# Patient Record
Sex: FEMALE | Race: WHITE | Employment: FULL TIME | URBAN - METROPOLITAN AREA
[De-identification: names, ages, dates, MRNs, and addresses within clinical notes are randomized per-mention and may not be internally consistent; named-entity substitution may affect disease eponyms.]

---

## 2017-02-27 ENCOUNTER — OFFICE VISIT (OUTPATIENT)
Dept: FAMILY MEDICINE | Facility: CLINIC | Age: 27
End: 2017-02-27
Payer: COMMERCIAL

## 2017-02-27 VITALS
BODY MASS INDEX: 30.04 KG/M2 | HEART RATE: 91 BPM | TEMPERATURE: 97.6 F | SYSTOLIC BLOOD PRESSURE: 110 MMHG | DIASTOLIC BLOOD PRESSURE: 76 MMHG | WEIGHT: 153 LBS | HEIGHT: 60 IN | OXYGEN SATURATION: 98 %

## 2017-02-27 DIAGNOSIS — Z32.00 ENCOUNTER FOR CONFIRMATION OF PREGNANCY TEST RESULT WITH PHYSICAL EXAMINATION: Primary | ICD-10-CM

## 2017-02-27 LAB — BETA HCG QUAL IFA URINE: POSITIVE

## 2017-02-27 PROCEDURE — 84703 CHORIONIC GONADOTROPIN ASSAY: CPT | Performed by: PHYSICIAN ASSISTANT

## 2017-02-27 PROCEDURE — 99213 OFFICE O/P EST LOW 20 MIN: CPT | Performed by: PHYSICIAN ASSISTANT

## 2017-02-27 RX ORDER — PRENATAL VIT/IRON FUM/FOLIC AC 27MG-0.8MG
1 TABLET ORAL DAILY
Qty: 100 TABLET | Refills: 3 | COMMUNITY
Start: 2017-02-27 | End: 2019-01-28

## 2017-02-27 NOTE — MR AVS SNAPSHOT
After Visit Summary   2/27/2017    Guera Abreu    MRN: [id number]           Patient Information     Date Of Birth          1990        Visit Information        Provider Department      2/27/2017 6:00 PM Lori Fajardo PA-C [facility]'s Diagnoses     Encounter for confirmation of pregnancy test result with physical examination    -  1      Care Instructions    Please have ultrasound done this week.  Followup will be dependent on the results of scan.          Follow-ups after your visit        Future tests that were ordered for you today     Open Future Orders        Priority Expected Expires Ordered    US OB < 14 Weeks Single Routine  2/27/2018 2/27/2017            Who to contact     If you have questions or need follow up information about today's clinic visit or your schedule please contact [facility] directly at [phone].  Normal or non-critical lab and imaging results will be communicated to you by MyChart, letter or phone within 4 business days after the clinic has received the results. If you do not hear from us within 7 days, please contact the clinic through ShareDeskhart or phone. If you have a critical or abnormal lab result, we will notify you by phone as soon as possible.  Submit refill requests through Garages2Envy or call your pharmacy and they will forward the refill request to us. Please allow 3 business days for your refill to be completed.          Additional Information About Your Visit        MyChart Information     Garages2En[employer] gives you secure access to your electronic health record. If you see a primary care provider, you can also send messages to your care team and make appointments. If you have questions, please call your primary care clinic.  If you do not have a primary care provider, please call [phone] and they will assist you.        Care EveryWhere ID     This is your Care EveryWhere ID. This could be used by other  organizations to access your [place] medical records  [id number]        Your Vitals Were     Pulse Temperature Height Last Period Pulse Oximetry Breastfeeding?    91 97.6  F (36.4  C) (Oral) 5' (1.524 m) 12/24/2016 (Exact Date) 98% No    BMI (Body Mass Index)                   29.88 kg/m2            Blood Pressure from Last 3 Encounters:   02/27/17 110/76   06/14/12 110/60   12/20/10 104/64    Weight from Last 3 Encounters:   02/27/17 153 lb (69.4 kg)   06/14/12 118 lb (53.5 kg)   12/20/10 117 lb 2 oz (53.1 kg) (29 %)*     * Growth percentiles are based on CDC 2-20 Years data.              We Performed the Following     Beta HCG qual IFA urine        Primary Care Provider Office Phone # Fax #    Charlotte Green [phone]       [address] [address]        Thank you!     Thank you for choosing [facility]  for your care. Our goal is always to provide you with excellent care. Hearing back from our patients is one way we can continue to improve our services. Please take a few minutes to complete the written survey that you may receive in the mail after your visit with us. Thank you!             Your Updated Medication List - Protect others around you: Learn how to safely use, store and throw away your medicines at www.disposemymeds.org.          This list is accurate as of: 2/27/17  6:41 PM.  Always use your most recent med list.                   Brand Name Dispense Instructions for use    prenatal multivitamin  plus iron 27-0.8 MG Tabs per tablet     100 tablet    Take 1 tablet by mouth daily

## 2017-02-28 ENCOUNTER — TRANSFERRED RECORDS (OUTPATIENT)
Dept: HEALTH INFORMATION MANAGEMENT | Facility: CLINIC | Age: 27
End: 2017-02-28

## 2017-02-28 NOTE — PROGRESS NOTES
SUBJECTIVE:                                                    Guera Abreu is a 26 year old female who presents to clinic today for the following health issues:      Pregnancy Confirmation -     Pt pregnancy History: none                 No LMP recorded. LMP -  12/24/2016 - lasted 3 days - 6 days late 01/27/2017 - 1 day menses                       History:    Positive Pregnancy test at home: YES- 8  Planned or unplanned pregnancy: Unplanned, Desired  Periods regular or irregular: regular  Taking Prenatal vitamins: YES    Accompanying Signs & Symptoms:   Bleeding: light to  Normal - lasts approx 3 days  Cramping: YES- mild 1st day -- little cramping on R side  Nausea and vomiting: None with menses -- curr yes - nausea  Breast Tenderness: None with menses -- Currently      Patient reports that she had a positive pregnancy test at home on Friday night.    Patient is feeling good and does not have concerns regarding symptoms.        Patient reports bleeding on January 27th for one day and a regular period on 12.24.2016.        Problem list and histories reviewed & adjusted, as indicated.  Additional history: as documented      ROS:  Constitutional, HEENT, cardiovascular, pulmonary, GI, , musculoskeletal, neuro, skin, endocrine and psych systems are negative, except as otherwise noted.    OBJECTIVE:                                                    /76 (BP Location: Left arm, Patient Position: Chair, Cuff Size: Adult Regular)  Pulse 91  Temp 97.6  F (36.4  C) (Oral)  Ht 5' (1.524 m)  Wt 153 lb (69.4 kg)  LMP 12/24/2016 (Exact Date)  SpO2 98%  Breastfeeding? No  BMI 29.88 kg/m2  Body mass index is 29.88 kg/(m^2).  GENERAL: healthy, alert and no distress  EYES: Eyes grossly normal to inspection, PERRL and conjunctivae and sclerae normal  NECK: no adenopathy, no asymmetry, masses, or scars and thyroid normal to palpation  RESP: lungs clear to auscultation - no rales, rhonchi or wheezes  CV: regular rate  and rhythm, normal S1 S2, no S3 or S4, no murmur, click or rub, no peripheral edema and peripheral pulses strong  ABDOMEN: soft, nontender, no hepatosplenomegaly, no masses and bowel sounds normal  MS: no gross musculoskeletal defects noted, no edema  SKIN: no suspicious lesions or rashes  NEURO: Normal strength and tone, mentation intact and speech normal  PSYCH: mentation appears normal, affect normal/bright    Diagnostic Test Results:  Urine HCG - Positive  Ultrasound - Pending     ASSESSMENT/PLAN:                                                      Guera was seen today for confirmation of pregnancy.    Diagnoses and all orders for this visit:    Encounter for confirmation of pregnancy test result with physical examination  -     Beta HCG qual IFA urine  -     US OB < 14 Weeks Single; Future    - Patient advised to have ultrasound done this week for dating purposes.  Followup will be based on pregnancy dates.    - Patient advised that it is typical to followup with OB/GYN around 8-10 weeks for blood work and pre-reina care.    - Patient is on pre- vitamin.      - Patient advised to followup as needed and to plan for a visit around 8-10 weeks to see OB care with either Dr. Green or Dr. Berry.      See Patient Instructions        Lori Fajardo PA-C    [facility] [place]

## 2017-02-28 NOTE — NURSING NOTE
Chief Complaint   Patient presents with     Confirmation Of Pregnancy       Initial /76 (BP Location: Left arm, Patient Position: Chair, Cuff Size: Adult Regular)  Pulse 91  Temp 97.6  F (36.4  C) (Oral)  Ht 5' (1.524 m)  Wt 153 lb (69.4 kg)  LMP 12/24/2016 (Exact Date)  SpO2 98%  Breastfeeding? No  BMI 29.88 kg/m2 Estimated body mass index is 29.88 kg/(m^2) as calculated from the following:    Height as of this encounter: 5' (1.524 m).    Weight as of this encounter: 153 lb (69.4 kg).  Medication Reconciliation: complete   Csaba Mlnarik CMA

## 2017-03-01 ENCOUNTER — TRANSFERRED RECORDS (OUTPATIENT)
Dept: HEALTH INFORMATION MANAGEMENT | Facility: CLINIC | Age: 27
End: 2017-03-01

## 2017-03-01 LAB — PAP SMEAR - HIM PATIENT REPORTED: NEGATIVE

## 2017-03-06 ENCOUNTER — TELEPHONE (OUTPATIENT)
Dept: FAMILY MEDICINE | Facility: CLINIC | Age: 27
End: 2017-03-06

## 2017-03-06 DIAGNOSIS — Z32.00 ENCOUNTER FOR CONFIRMATION OF PREGNANCY TEST RESULT WITH PHYSICAL EXAMINATION: Primary | ICD-10-CM

## 2017-03-06 NOTE — TELEPHONE ENCOUNTER
Pt called to check on results.  Please call her at [phone].  OK to leave detailed message.  Maryann Domingo  Patient

## 2017-03-06 NOTE — TELEPHONE ENCOUNTER
Please call patient to inform her:    I Received a faxed report of patient's early OB ultrasound from [facility].  It seems like the dating is too early to identify anything on ultrasound.  It is advised that she have a followup ultrasound done in 2 weeks.      Please ask patient if she plans to go to [facility] again for the scan.  The order for [facility] radiology is still in the chart if she desires to go there.     Thank you.

## 2017-03-06 NOTE — TELEPHONE ENCOUNTER
Pt will go to [facility] does not have later appts.    Pt has an OB they are thinking about going through.  [facility] [phone].    Judy Vasquez RN

## 2017-03-31 LAB
ABO + RH BLD: NORMAL
ABO + RH BLD: NORMAL
BLD GP AB SCN SERPL QL: NORMAL
HBV SURFACE AG SERPL QL IA: NORMAL
HIV 1+2 AB+HIV1 P24 AG SERPL QL IA: NORMAL
PLATELET # BLD AUTO: 250 10^9/L
RUBELLA ANTIBODY IGG QUANTITATIVE: NORMAL IU/ML
T PALLIDUM IGG SER QL: NORMAL

## 2017-06-10 ENCOUNTER — HEALTH MAINTENANCE LETTER (OUTPATIENT)
Age: 27
End: 2017-06-10

## 2017-09-11 ENCOUNTER — OFFICE VISIT (OUTPATIENT)
Dept: FAMILY MEDICINE | Facility: CLINIC | Age: 27
End: 2017-09-11
Payer: COMMERCIAL

## 2017-09-11 VITALS
BODY MASS INDEX: 32.39 KG/M2 | SYSTOLIC BLOOD PRESSURE: 112 MMHG | HEART RATE: 85 BPM | WEIGHT: 165 LBS | HEIGHT: 60 IN | TEMPERATURE: 98.4 F | DIASTOLIC BLOOD PRESSURE: 72 MMHG | OXYGEN SATURATION: 99 %

## 2017-09-11 DIAGNOSIS — K14.1 GEOGRAPHIC TONGUE: Primary | ICD-10-CM

## 2017-09-11 PROCEDURE — 99213 OFFICE O/P EST LOW 20 MIN: CPT | Performed by: PHYSICIAN ASSISTANT

## 2017-09-11 NOTE — Clinical Note
Please abstract the following data from this visit with this patient into the appropriate field in Epic:  Pap smear done on this date: 03/2017 (approximately), by this group: [facility], results were Normal.

## 2017-09-11 NOTE — MR AVS SNAPSHOT
After Visit Summary   9/11/2017    Guera Abreu    MRN: [id number]           Patient Information     Date Of Birth          1990        Visit Information        Provider Department      9/11/2017 6:00 PM Lori Fajardo PA-C [facility]        Today's Diagnoses     Geographic tongue    -  1       Follow-ups after your visit        Who to contact     If you have questions or need follow up information about today's clinic visit or your schedule please contact [facility] directly at [phone].  Normal or non-critical lab and imaging results will be communicated to you by Be[employer] Musichart, letter or phone within 4 business days after the clinic has received the results. If you do not hear from us within 7 days, please contact the clinic through ihijit or phone. If you have a critical or abnormal lab result, we will notify you by phone as soon as possible.  Submit refill requests through Bonegrafix or call your pharmacy and they will forward the refill request to us. Please allow 3 business days for your refill to be completed.          Additional Information About Your Visit        MyChart Information     Bone[employer]fix gives you secure access to your electronic health record. If you see a primary care provider, you can also send messages to your care team and make appointments. If you have questions, please call your primary care clinic.  If you do not have a primary care provider, please call [phone] and they will assist you.        Care EveryWhere ID     This is your Care EveryWhere ID. This could be used by other organizations to access your [place] medical records  [id number]        Your Vitals Were     Pulse Temperature Height Last Period Pulse Oximetry Breastfeeding?    85 98.4  F (36.9  C) (Oral) 5' (1.524 m) 12/24/2016 (Exact Date) 99% No    BMI (Body Mass Index)                   32.22 kg/m2            Blood Pressure from Last 3 Encounters:   09/11/17  112/72   02/27/17 110/76   06/14/12 110/60    Weight from Last 3 Encounters:   09/11/17 165 lb (74.8 kg)   02/27/17 153 lb (69.4 kg)   06/14/12 118 lb (53.5 kg)              Today, you had the following     No orders found for display       Primary Care Provider Office Phone # Fax #    Charlotte Green [phone]       [address] [place] [address]        Equal Access to Services     [facility] IAN : Hadii erin dong hadasho Soomaali, waaxda luqadaha, qaybta kaalmada adeegyavesta, cecelia herrera . So [facility] [phone].    ATENCIÓN: Si habla español, tiene a cormier disposición servicios gratuitos de asistencia lingüística. Llame al [phone].    We comply with applicable federal civil rights laws and [place] laws. We do not discriminate on the basis of race, color, national origin, age, disability sex, sexual orientation or gender identity.            Thank you!     Thank you for choosing [facility]  for your care. Our goal is always to provide you with excellent care. Hearing back from our patients is one way we can continue to improve our services. Please take a few minutes to complete the written survey that you may receive in the mail after your visit with us. Thank you!             Your Updated Medication List - Protect others around you: Learn how to safely use, store and throw away your medicines at www.disposemymeds.org.          This list is accurate as of: 9/11/17 11:59 PM.  Always use your most recent med list.                   Brand Name Dispense Instructions for use Diagnosis    prenatal multivitamin plus iron 27-0.8 MG Tabs per tablet     100 tablet    Take 1 tablet by mouth daily

## 2017-09-11 NOTE — NURSING NOTE
Chief Complaint   Patient presents with     Mouth Problem       Initial /72 (BP Location: Right arm, Patient Position: Chair, Cuff Size: Adult Large)  Pulse 85  Temp 98.4  F (36.9  C) (Oral)  Ht 5' (1.524 m)  Wt 165 lb (74.8 kg)  LMP 12/24/2016 (Exact Date)  SpO2 99%  Breastfeeding? No  BMI 32.22 kg/m2 Estimated body mass index is 32.22 kg/(m^2) as calculated from the following:    Height as of this encounter: 5' (1.524 m).    Weight as of this encounter: 165 lb (74.8 kg).  Medication Reconciliation: complete   Csaba Mlnarik CMA

## 2017-09-11 NOTE — PROGRESS NOTES
"  SUBJECTIVE:                                                    Guera Abreu is a 27 year old female who presents to clinic today for the following health issues:      Concern - Tongue problem  Onset: x1 year    Description:   Pattern on tongue - changes from day to day    Intensity: mild, moderate    Progression of Symptoms:  worsening and constant in appearance    Accompanying Signs & Symptoms:  More tender depending of what is eaten, redness     Previous history of similar problem:   nothing    Precipitating factors:   Worsened by: citrus foods    Alleviating factors:  Improved by: nothing    Therapies Tried and outcome: brushed tongue - no relief      Patient reports that she has had this issues for the past year.  She states that it is sometimes tender or bothersome depending on what she eats.  She is not real \"bothered\" by it, but wants to know what it is.        Problem list and histories reviewed & adjusted, as indicated.  Additional history: as documented      ROS:  Constitutional, HEENT, cardiovascular, pulmonary, GI, , musculoskeletal, neuro, skin, endocrine and psych systems are negative, except as otherwise noted.    OBJECTIVE:                                                    /72 (BP Location: Right arm, Patient Position: Chair, Cuff Size: Adult Large)  Pulse 85  Temp 98.4  F (36.9  C) (Oral)  Ht 5' (1.524 m)  Wt 165 lb (74.8 kg)  LMP 12/24/2016 (Exact Date)  SpO2 99%  Breastfeeding? No  BMI 32.22 kg/m2  Body mass index is 32.22 kg/(m^2).  GENERAL: healthy, alert and no distress  EYES: Eyes grossly normal to inspection, PERRL and conjunctivae and sclerae normal  NECK: no adenopathy, no asymmetry, masses, or scars and thyroid normal to palpation  RESP: lungs clear to auscultation - no rales, rhonchi or wheezes  CV: regular rate and rhythm, normal S1 S2, no S3 or S4, no murmur, click or rub, no peripheral edema and peripheral pulses strong  MS: no gross musculoskeletal defects " noted, no edema  SKIN: swirled, map like appearance on top surface of the tongue.    NEURO: Normal strength and tone, mentation intact and speech normal  PSYCH: mentation appears normal, affect normal/bright    Diagnostic Test Results:  none      ASSESSMENT/PLAN:                                                      Guera was seen today for mouth problem.    Diagnoses and all orders for this visit:    Geographic tongue    - Reviewed images of geographic tongue and typical presentation.  Patient is consistent with this diagnosis.  No particular treatments advised.  Supportive cares and to watch diet if there are foods that aggravate the tongue.      - Patient to followup as needed, or sooner if symptoms change or worsen at all.      - Patient agrees with and understands the plan today.      See Patient Instructions        Lori Fajardo PA-C    [facility] [place]

## 2017-09-28 ENCOUNTER — TELEPHONE (OUTPATIENT)
Dept: FAMILY MEDICINE | Facility: CLINIC | Age: 27
End: 2017-09-28

## 2017-09-28 NOTE — TELEPHONE ENCOUNTER
Attempt #1  Called   Telephone Information:   Mobile [phone]   Left a nondetailed message    Marylu Oshea RN  [facility]

## 2017-09-28 NOTE — TELEPHONE ENCOUNTER
Please call patient to see how she is doing in regards to the tongue.     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  She can followup with primary skin for further evaluation and biopsy to confirm if she is bothered by any symptoms.  Please help her schedule this appointment.

## 2017-09-28 NOTE — TELEPHONE ENCOUNTER
Pt is doing well. Pt will not make call for skin care now.    Judy Vasquez RN  [facility]

## 2017-09-29 LAB — GROUP B STREP PCR: NORMAL

## 2017-11-02 RX ORDER — IBUPROFEN 400 MG/1
800 TABLET, FILM COATED ORAL
Status: CANCELLED | OUTPATIENT
Start: 2017-11-02

## 2017-11-02 RX ORDER — OXYTOCIN/0.9 % SODIUM CHLORIDE 30/500 ML
1-24 PLASTIC BAG, INJECTION (ML) INTRAVENOUS CONTINUOUS
Status: CANCELLED | OUTPATIENT
Start: 2017-11-02

## 2017-11-02 RX ORDER — OXYCODONE AND ACETAMINOPHEN 5; 325 MG/1; MG/1
1 TABLET ORAL
Status: CANCELLED | OUTPATIENT
Start: 2017-11-02

## 2017-11-02 RX ORDER — METHYLERGONOVINE MALEATE 0.2 MG/ML
200 INJECTION INTRAVENOUS
Status: CANCELLED | OUTPATIENT
Start: 2017-11-02

## 2017-11-02 RX ORDER — OXYTOCIN 10 [USP'U]/ML
10 INJECTION, SOLUTION INTRAMUSCULAR; INTRAVENOUS
Status: CANCELLED | OUTPATIENT
Start: 2017-11-02

## 2017-11-02 RX ORDER — ACETAMINOPHEN 325 MG/1
650 TABLET ORAL EVERY 4 HOURS PRN
Status: CANCELLED | OUTPATIENT
Start: 2017-11-02

## 2017-11-02 RX ORDER — LIDOCAINE 40 MG/G
CREAM TOPICAL
Status: CANCELLED | OUTPATIENT
Start: 2017-11-02

## 2017-11-02 RX ORDER — NALOXONE HYDROCHLORIDE 0.4 MG/ML
.1-.4 INJECTION, SOLUTION INTRAMUSCULAR; INTRAVENOUS; SUBCUTANEOUS
Status: CANCELLED | OUTPATIENT
Start: 2017-11-02

## 2017-11-02 RX ORDER — CARBOPROST TROMETHAMINE 250 UG/ML
250 INJECTION, SOLUTION INTRAMUSCULAR
Status: CANCELLED | OUTPATIENT
Start: 2017-11-02

## 2017-11-02 RX ORDER — ONDANSETRON 2 MG/ML
4 INJECTION INTRAMUSCULAR; INTRAVENOUS EVERY 6 HOURS PRN
Status: CANCELLED | OUTPATIENT
Start: 2017-11-02

## 2017-11-02 RX ORDER — SODIUM CHLORIDE, SODIUM LACTATE, POTASSIUM CHLORIDE, CALCIUM CHLORIDE 600; 310; 30; 20 MG/100ML; MG/100ML; MG/100ML; MG/100ML
INJECTION, SOLUTION INTRAVENOUS CONTINUOUS
Status: CANCELLED | OUTPATIENT
Start: 2017-11-02

## 2017-11-02 RX ORDER — OXYTOCIN/0.9 % SODIUM CHLORIDE 30/500 ML
100-340 PLASTIC BAG, INJECTION (ML) INTRAVENOUS CONTINUOUS PRN
Status: CANCELLED | OUTPATIENT
Start: 2017-11-02

## 2017-11-04 ENCOUNTER — ANESTHESIA EVENT (OUTPATIENT)
Dept: OBGYN | Facility: CLINIC | Age: 27
End: 2017-11-04
Payer: COMMERCIAL

## 2017-11-04 ENCOUNTER — ANESTHESIA (OUTPATIENT)
Dept: OBGYN | Facility: CLINIC | Age: 27
End: 2017-11-04
Payer: COMMERCIAL

## 2017-11-04 ENCOUNTER — HOSPITAL ENCOUNTER (INPATIENT)
Facility: CLINIC | Age: 27
LOS: 2 days | Discharge: HOME | End: 2017-11-06
Attending: OBSTETRICS & GYNECOLOGY | Admitting: OBSTETRICS & GYNECOLOGY
Payer: COMMERCIAL

## 2017-11-04 LAB
ABO + RH BLD: NORMAL
ABO + RH BLD: NORMAL
BASOPHILS # BLD AUTO: 0 10E9/L (ref 0–0.2)
BASOPHILS NFR BLD AUTO: 0.1 %
BLOOD BANK CMNT PATIENT-IMP: NORMAL
DIFFERENTIAL METHOD BLD: ABNORMAL
EOSINOPHIL # BLD AUTO: 0.1 10E9/L (ref 0–0.7)
EOSINOPHIL NFR BLD AUTO: 0.5 %
ERYTHROCYTE [DISTWIDTH] IN BLOOD BY AUTOMATED COUNT: 14.2 % (ref 10–15)
HCT VFR BLD AUTO: 35.7 % (ref 35–47)
HGB BLD-MCNC: 12.1 G/DL (ref 11.7–15.7)
IMM GRANULOCYTES # BLD: 0.1 10E9/L (ref 0–0.4)
IMM GRANULOCYTES NFR BLD: 0.6 %
LYMPHOCYTES # BLD AUTO: 1.3 10E9/L (ref 0.8–5.3)
LYMPHOCYTES NFR BLD AUTO: 10.2 %
MCH RBC QN AUTO: 27.6 PG (ref 26.5–33)
MCHC RBC AUTO-ENTMCNC: 33.9 G/DL (ref 31.5–36.5)
MCV RBC AUTO: 82 FL (ref 78–100)
MONOCYTES # BLD AUTO: 0.6 10E9/L (ref 0–1.3)
MONOCYTES NFR BLD AUTO: 4.7 %
NEUTROPHILS # BLD AUTO: 10.6 10E9/L (ref 1.6–8.3)
NEUTROPHILS NFR BLD AUTO: 83.9 %
NRBC # BLD AUTO: 0 10*3/UL
NRBC BLD AUTO-RTO: 0 /100
PLATELET # BLD AUTO: 174 10E9/L (ref 150–450)
RBC # BLD AUTO: 4.38 10E12/L (ref 3.8–5.2)
SPECIMEN EXP DATE BLD: NORMAL
WBC # BLD AUTO: 12.6 10E9/L (ref 4–11)

## 2017-11-04 PROCEDURE — 12000037 ZZH R&B POSTPARTUM INTERMEDIATE

## 2017-11-04 PROCEDURE — 85025 COMPLETE CBC W/AUTO DIFF WBC: CPT | Performed by: OBSTETRICS & GYNECOLOGY

## 2017-11-04 PROCEDURE — 99215 OFFICE O/P EST HI 40 MIN: CPT | Mod: 25

## 2017-11-04 PROCEDURE — 86901 BLOOD TYPING SEROLOGIC RH(D): CPT | Performed by: OBSTETRICS & GYNECOLOGY

## 2017-11-04 PROCEDURE — 25000128 H RX IP 250 OP 636: Performed by: OBSTETRICS & GYNECOLOGY

## 2017-11-04 PROCEDURE — 0UQMXZZ REPAIR VULVA, EXTERNAL APPROACH: ICD-10-PCS | Performed by: OBSTETRICS & GYNECOLOGY

## 2017-11-04 PROCEDURE — 25000132 ZZH RX MED GY IP 250 OP 250 PS 637: Performed by: OBSTETRICS & GYNECOLOGY

## 2017-11-04 PROCEDURE — 37000011 ZZH ANESTHESIA WARD SERVICE

## 2017-11-04 PROCEDURE — 59025 FETAL NON-STRESS TEST: CPT

## 2017-11-04 PROCEDURE — 3E0R3BZ INTRODUCTION OF ANESTHETIC AGENT INTO SPINAL CANAL, PERCUTANEOUS APPROACH: ICD-10-PCS | Performed by: ANESTHESIOLOGY

## 2017-11-04 PROCEDURE — 00HU33Z INSERTION OF INFUSION DEVICE INTO SPINAL CANAL, PERCUTANEOUS APPROACH: ICD-10-PCS | Performed by: ANESTHESIOLOGY

## 2017-11-04 PROCEDURE — 25000128 H RX IP 250 OP 636: Performed by: ANESTHESIOLOGY

## 2017-11-04 PROCEDURE — 72200001 ZZH LABOR CARE VAGINAL DELIVERY SINGLE

## 2017-11-04 PROCEDURE — 86900 BLOOD TYPING SEROLOGIC ABO: CPT | Performed by: OBSTETRICS & GYNECOLOGY

## 2017-11-04 PROCEDURE — 0HQ9XZZ REPAIR PERINEUM SKIN, EXTERNAL APPROACH: ICD-10-PCS | Performed by: OBSTETRICS & GYNECOLOGY

## 2017-11-04 PROCEDURE — 86780 TREPONEMA PALLIDUM: CPT | Performed by: OBSTETRICS & GYNECOLOGY

## 2017-11-04 PROCEDURE — 25000125 ZZHC RX 250: Performed by: OBSTETRICS & GYNECOLOGY

## 2017-11-04 PROCEDURE — 10907ZC DRAINAGE OF AMNIOTIC FLUID, THERAPEUTIC FROM PRODUCTS OF CONCEPTION, VIA NATURAL OR ARTIFICIAL OPENING: ICD-10-PCS | Performed by: OBSTETRICS & GYNECOLOGY

## 2017-11-04 PROCEDURE — 36415 COLL VENOUS BLD VENIPUNCTURE: CPT | Performed by: OBSTETRICS & GYNECOLOGY

## 2017-11-04 RX ORDER — NALBUPHINE HYDROCHLORIDE 10 MG/ML
2.5-5 INJECTION, SOLUTION INTRAMUSCULAR; INTRAVENOUS; SUBCUTANEOUS EVERY 6 HOURS PRN
Status: DISCONTINUED | OUTPATIENT
Start: 2017-11-04 | End: 2017-11-04 | Stop reason: CLARIF

## 2017-11-04 RX ORDER — LANOLIN 100 %
OINTMENT (GRAM) TOPICAL
Status: DISCONTINUED | OUTPATIENT
Start: 2017-11-04 | End: 2017-11-06 | Stop reason: HOSPADM

## 2017-11-04 RX ORDER — EPHEDRINE SULFATE 50 MG/ML
5 INJECTION, SOLUTION INTRAMUSCULAR; INTRAVENOUS; SUBCUTANEOUS
Status: DISCONTINUED | OUTPATIENT
Start: 2017-11-04 | End: 2017-11-04 | Stop reason: CLARIF

## 2017-11-04 RX ORDER — CARBOPROST TROMETHAMINE 250 UG/ML
250 INJECTION, SOLUTION INTRAMUSCULAR
Status: DISCONTINUED | OUTPATIENT
Start: 2017-11-04 | End: 2017-11-04 | Stop reason: CLARIF

## 2017-11-04 RX ORDER — ACETAMINOPHEN 325 MG/1
650 TABLET ORAL EVERY 4 HOURS PRN
Status: DISCONTINUED | OUTPATIENT
Start: 2017-11-04 | End: 2017-11-06 | Stop reason: HOSPADM

## 2017-11-04 RX ORDER — ROPIVACAINE HYDROCHLORIDE 2 MG/ML
10 INJECTION, SOLUTION EPIDURAL; INFILTRATION; PERINEURAL ONCE
Status: COMPLETED | OUTPATIENT
Start: 2017-11-04 | End: 2017-11-04

## 2017-11-04 RX ORDER — NALOXONE HYDROCHLORIDE 0.4 MG/ML
.1-.4 INJECTION, SOLUTION INTRAMUSCULAR; INTRAVENOUS; SUBCUTANEOUS
Status: DISCONTINUED | OUTPATIENT
Start: 2017-11-04 | End: 2017-11-04 | Stop reason: CLARIF

## 2017-11-04 RX ORDER — NALOXONE HYDROCHLORIDE 0.4 MG/ML
.1-.4 INJECTION, SOLUTION INTRAMUSCULAR; INTRAVENOUS; SUBCUTANEOUS
Status: DISCONTINUED | OUTPATIENT
Start: 2017-11-04 | End: 2017-11-06 | Stop reason: HOSPADM

## 2017-11-04 RX ORDER — ONDANSETRON 2 MG/ML
4 INJECTION INTRAMUSCULAR; INTRAVENOUS EVERY 6 HOURS PRN
Status: DISCONTINUED | OUTPATIENT
Start: 2017-11-04 | End: 2017-11-04 | Stop reason: CLARIF

## 2017-11-04 RX ORDER — SODIUM CHLORIDE, SODIUM LACTATE, POTASSIUM CHLORIDE, CALCIUM CHLORIDE 600; 310; 30; 20 MG/100ML; MG/100ML; MG/100ML; MG/100ML
INJECTION, SOLUTION INTRAVENOUS CONTINUOUS
Status: DISCONTINUED | OUTPATIENT
Start: 2017-11-04 | End: 2017-11-04 | Stop reason: CLARIF

## 2017-11-04 RX ORDER — PRENATAL VIT/IRON FUM/FOLIC AC 27MG-0.8MG
1 TABLET ORAL DAILY
Status: DISCONTINUED | OUTPATIENT
Start: 2017-11-05 | End: 2017-11-04 | Stop reason: CLARIF

## 2017-11-04 RX ORDER — AMOXICILLIN 250 MG
1-2 CAPSULE ORAL 2 TIMES DAILY
Status: DISCONTINUED | OUTPATIENT
Start: 2017-11-04 | End: 2017-11-06 | Stop reason: HOSPADM

## 2017-11-04 RX ORDER — IBUPROFEN 400 MG/1
400-800 TABLET, FILM COATED ORAL EVERY 6 HOURS PRN
Status: DISCONTINUED | OUTPATIENT
Start: 2017-11-04 | End: 2017-11-06 | Stop reason: HOSPADM

## 2017-11-04 RX ORDER — LIDOCAINE 40 MG/G
CREAM TOPICAL
Status: DISCONTINUED | OUTPATIENT
Start: 2017-11-04 | End: 2017-11-04 | Stop reason: CLARIF

## 2017-11-04 RX ORDER — FENTANYL CITRATE 50 UG/ML
50-100 INJECTION, SOLUTION INTRAMUSCULAR; INTRAVENOUS
Status: DISCONTINUED | OUTPATIENT
Start: 2017-11-04 | End: 2017-11-04 | Stop reason: CLARIF

## 2017-11-04 RX ORDER — BISACODYL 10 MG
10 SUPPOSITORY, RECTAL RECTAL DAILY PRN
Status: DISCONTINUED | OUTPATIENT
Start: 2017-11-06 | End: 2017-11-06 | Stop reason: HOSPADM

## 2017-11-04 RX ORDER — MISOPROSTOL 200 UG/1
400 TABLET ORAL
Status: DISCONTINUED | OUTPATIENT
Start: 2017-11-04 | End: 2017-11-06 | Stop reason: HOSPADM

## 2017-11-04 RX ORDER — BUPIVACAINE HYDROCHLORIDE 2.5 MG/ML
INJECTION, SOLUTION EPIDURAL; INFILTRATION; INTRACAUDAL
Status: DISCONTINUED
Start: 2017-11-04 | End: 2017-11-04 | Stop reason: HOSPADM

## 2017-11-04 RX ORDER — OXYTOCIN 10 [USP'U]/ML
10 INJECTION, SOLUTION INTRAMUSCULAR; INTRAVENOUS
Status: DISCONTINUED | OUTPATIENT
Start: 2017-11-04 | End: 2017-11-06 | Stop reason: HOSPADM

## 2017-11-04 RX ORDER — OXYTOCIN/0.9 % SODIUM CHLORIDE 30/500 ML
1-24 PLASTIC BAG, INJECTION (ML) INTRAVENOUS CONTINUOUS
Status: DISCONTINUED | OUTPATIENT
Start: 2017-11-04 | End: 2017-11-04 | Stop reason: CLARIF

## 2017-11-04 RX ORDER — ACETAMINOPHEN 325 MG/1
650 TABLET ORAL EVERY 4 HOURS PRN
Status: DISCONTINUED | OUTPATIENT
Start: 2017-11-04 | End: 2017-11-04

## 2017-11-04 RX ORDER — OXYCODONE AND ACETAMINOPHEN 5; 325 MG/1; MG/1
1 TABLET ORAL
Status: DISCONTINUED | OUTPATIENT
Start: 2017-11-04 | End: 2017-11-04

## 2017-11-04 RX ORDER — OXYTOCIN/0.9 % SODIUM CHLORIDE 30/500 ML
340 PLASTIC BAG, INJECTION (ML) INTRAVENOUS CONTINUOUS PRN
Status: DISCONTINUED | OUTPATIENT
Start: 2017-11-04 | End: 2017-11-06 | Stop reason: HOSPADM

## 2017-11-04 RX ORDER — IBUPROFEN 400 MG/1
800 TABLET, FILM COATED ORAL
Status: DISCONTINUED | OUTPATIENT
Start: 2017-11-04 | End: 2017-11-04

## 2017-11-04 RX ORDER — OXYTOCIN/0.9 % SODIUM CHLORIDE 30/500 ML
100-340 PLASTIC BAG, INJECTION (ML) INTRAVENOUS CONTINUOUS PRN
Status: COMPLETED | OUTPATIENT
Start: 2017-11-04 | End: 2017-11-04

## 2017-11-04 RX ORDER — OXYTOCIN/0.9 % SODIUM CHLORIDE 30/500 ML
100 PLASTIC BAG, INJECTION (ML) INTRAVENOUS CONTINUOUS
Status: DISCONTINUED | OUTPATIENT
Start: 2017-11-04 | End: 2017-11-06 | Stop reason: HOSPADM

## 2017-11-04 RX ORDER — HYDROCORTISONE 2.5 %
CREAM (GRAM) TOPICAL 3 TIMES DAILY PRN
Status: DISCONTINUED | OUTPATIENT
Start: 2017-11-04 | End: 2017-11-06 | Stop reason: HOSPADM

## 2017-11-04 RX ORDER — OXYTOCIN 10 [USP'U]/ML
10 INJECTION, SOLUTION INTRAMUSCULAR; INTRAVENOUS
Status: DISCONTINUED | OUTPATIENT
Start: 2017-11-04 | End: 2017-11-04 | Stop reason: CLARIF

## 2017-11-04 RX ORDER — BUPIVACAINE HYDROCHLORIDE 2.5 MG/ML
9 INJECTION, SOLUTION INFILTRATION; PERINEURAL ONCE
Status: DISCONTINUED | OUTPATIENT
Start: 2017-11-04 | End: 2017-11-04 | Stop reason: CLARIF

## 2017-11-04 RX ORDER — METHYLERGONOVINE MALEATE 0.2 MG/ML
200 INJECTION INTRAVENOUS
Status: DISCONTINUED | OUTPATIENT
Start: 2017-11-04 | End: 2017-11-04 | Stop reason: CLARIF

## 2017-11-04 RX ADMIN — IBUPROFEN 800 MG: 400 TABLET ORAL at 22:37

## 2017-11-04 RX ADMIN — SODIUM CHLORIDE, POTASSIUM CHLORIDE, SODIUM LACTATE AND CALCIUM CHLORIDE 125 ML/HR: 600; 310; 30; 20 INJECTION, SOLUTION INTRAVENOUS at 11:38

## 2017-11-04 RX ADMIN — OXYTOCIN-SODIUM CHLORIDE 0.9% IV SOLN 30 UNIT/500ML 1 MILLI-UNITS/MIN: 30-0.9/5 SOLUTION at 14:54

## 2017-11-04 RX ADMIN — SODIUM CHLORIDE, POTASSIUM CHLORIDE, SODIUM LACTATE AND CALCIUM CHLORIDE 1000 ML: 600; 310; 30; 20 INJECTION, SOLUTION INTRAVENOUS at 11:45

## 2017-11-04 RX ADMIN — OXYTOCIN-SODIUM CHLORIDE 0.9% IV SOLN 30 UNIT/500ML 340 ML/HR: 30-0.9/5 SOLUTION at 20:44

## 2017-11-04 RX ADMIN — Medication 11 ML/HR: at 12:39

## 2017-11-04 RX ADMIN — ACETAMINOPHEN 650 MG: 325 TABLET, FILM COATED ORAL at 22:37

## 2017-11-04 RX ADMIN — OXYTOCIN-SODIUM CHLORIDE 0.9% IV SOLN 30 UNIT/500ML 100 ML/HR: 30-0.9/5 SOLUTION at 21:22

## 2017-11-04 RX ADMIN — ROPIVACAINE HYDROCHLORIDE 9 ML: 2 INJECTION, SOLUTION EPIDURAL; INFILTRATION at 12:30

## 2017-11-04 RX ADMIN — SODIUM CHLORIDE, POTASSIUM CHLORIDE, SODIUM LACTATE AND CALCIUM CHLORIDE 125 ML/HR: 600; 310; 30; 20 INJECTION, SOLUTION INTRAVENOUS at 12:39

## 2017-11-04 ASSESSMENT — ENCOUNTER SYMPTOMS
DYSRHYTHMIAS: 0
SEIZURES: 0

## 2017-11-04 NOTE — ANESTHESIA PREPROCEDURE EVALUATION
"Procedure: LAURENCE  Preop diagnosis: IUP/Labor     No Known Allergies  Past Medical History:   Diagnosis Date     Allergic rhinitis, cause unspecified      Other closed skull fracture without mention of intracranial injury, unspecified state of consciousness      Tendonitis of wrist, right 4/1/2012     Past Surgical History:   Procedure Laterality Date      wisdom teeth       NO HISTORY OF SURGERY       Social History   Substance Use Topics     Smoking status: Never Smoker     Smokeless tobacco: Never Used     Alcohol use No     Prior to Admission medications    Medication Sig Start Date End Date Taking? Authorizing Provider   Prenatal Vit-Fe Fumarate-FA (PRENATAL MULTIVITAMIN  PLUS IRON) 27-0.8 MG TABS per tablet Take 1 tablet by mouth daily 2/27/17  Yes Lori Fajardo, SHANNAN     Current Facility-Administered Medications Ordered in Epic   Medication Dose Route Frequency Last Rate Last Dose     lactated ringers infusion   Intravenous Continuous 125 mL/hr at 11/04/17 1239 125 mL/hr at 11/04/17 1239     lactated ringers BOLUS 500 mL  500 mL Intravenous Once PRN         acetaminophen (TYLENOL) tablet 650 mg  650 mg Oral Q4H PRN         naloxone (NARCAN) injection 0.1-0.4 mg  0.1-0.4 mg Intravenous Q2 Min PRN         ondansetron (ZOFRAN) injection 4 mg  4 mg Intravenous Q6H PRN         oxytocin (PITOCIN) injection 10 Units  10 Units Intramuscular Once PRN         oxytocin (PITOCIN) 30 units in 500 mL 0.9% NaCl infusion  100-340 mL/hr Intravenous Continuous PRN         Medication Instructions: misoprostol (CYTOTEC)- Nurse to discuss ordering with provider, if needed. Ordered via \"OB misoprostol (CYTOTEC) Postpartum Hemorrhage PANEL\"   Does not apply Continuous PRN         methylergonovine (METHERGINE) injection 200 mcg  200 mcg Intramuscular Once PRN         carboprost (HEMABATE) injection 250 mcg  250 mcg Intramuscular Once PRN         lidocaine 1 % 0.1-20 mL  0.1-20 mL Subcutaneous Once PRN         ibuprofen " (ADVIL/MOTRIN) tablet 800 mg  800 mg Oral Once PRN         oxyCODONE-acetaminophen (PERCOCET) 5-325 MG per tablet 1 tablet  1 tablet Oral Once PRN         lidocaine 1 % 1 mL  1 mL Other Q1H PRN         lidocaine (LMX4) cream   Topical Q1H PRN         sodium chloride (PF) 0.9% PF flush 3 mL  3 mL Intracatheter Q8H         fentaNYL (PF) (SUBLIMAZE) injection  mcg   mcg Intravenous Q1H PRN         nitrous oxide/oxygen 50/50 blend   Inhalation Continuous PRN         medication instruction   Does not apply Continuous PRN         Opioid plan postpartum - medication instruction   Does not apply Continuous PRN         ropivacaine (NAROPIN) injection 10 mL  10 mL EPIDURAL Once         fentaNYL (SUBLIMAZE) 2 mcg/mL, ropivacaine (NAROPIN) 0.2% in NaCl 0.9% EPIDURAL infusion   EPIDURAL Continuous 11 mL/hr at 11/04/17 1239 11 mL/hr at 11/04/17 1239     lactated ringers BOLUS 250 mL  250 mL Intravenous Once PRN         ePHEDrine injection 5 mg  5 mg Intravenous Q3 Min PRN         nalbuphine (NUBAIN) injection 2.5-5 mg  2.5-5 mg Intravenous Q6H PRN         naloxone (NARCAN) injection 0.1-0.4 mg  0.1-0.4 mg Intravenous Q2 Min PRN         medication instruction   Does not apply Continuous PRN         No current [facility]-ordered outpatient prescriptions on file.       lactated ringers 125 mL/hr (11/04/17 1239)     oxytocin in 0.9% NaCl       - MEDICATION INSTRUCTIONS -       nitrous oxide/oxygen 50/50 blend       - MEDICATION INSTRUCTIONS -       - MEDICATION INSTRUCTIONS -       fentaNYL-ropivacaine 11 mL/hr (11/04/17 1239)     - MEDICATION INSTRUCTIONS -       Wt Readings from Last 1 Encounters:   11/04/17 79.4 kg (175 lb)     Temp Readings from Last 1 Encounters:   11/04/17 36.8  C (98.2  F) (Temporal)     BP Readings from Last 6 Encounters:   11/04/17 94/59   09/11/17 112/72   02/27/17 110/76   06/14/12 110/60   12/20/10 104/64   10/30/09 102/66     Pulse Readings from Last 4 Encounters:   09/11/17 85   02/27/17 91    06/14/12 74   12/20/10 96     Resp Readings from Last 1 Encounters:   11/04/17 16     SpO2 Readings from Last 1 Encounters:   11/04/17 99%     Recent Labs   Lab Test  07/19/12   0955  11/06/09   1059   NA  139  139   POTASSIUM  4.0  4.5   CHLORIDE  106  104   CO2  24  27   ANIONGAP  9  8   GLC  86  78   BUN  9  14   CR  0.64  0.74   MATTHEW  8.9  9.8     Recent Labs   Lab Test  07/19/12   0955   AST  19   ALT  <6   ALKPHOS  55   BILITOTAL  0.6     Recent Labs   Lab Test  11/04/17   1125 03/31/17 07/19/12   0955   WBC  12.6*   --   6.3   HGB  12.1   --   12.3   PLT  174  250  219     Recent Labs   Lab Test  11/04/17   1125   ABO  A   RH  Neg     Recent Labs   Lab Test  12/20/10   1116   HCG  Negative  This test provides a presumptive diagnosis of pregnancy or non-pregnancy. A  confirmed pregnancy diagnosis should only be made by a physician after all  clinical and laboratory findings have been evaluated.     Anesthesia Evaluation     .             ROS/MED HX    ENT/Pulmonary:     (+)allergic rhinitis, , . .   (-) asthma and sleep apnea   Neurologic:      (-) seizures and CVA   Cardiovascular:        (-) hypertension, PIH, syncope, arrhythmias, irregular heartbeat/palpitations and valvular problems/murmurs   METS/Exercise Tolerance:     Hematologic: Comments: Denies EZ bleeding/bruising/blood thinner use        Musculoskeletal:         GI/Hepatic:     (+) GERD      (-) liver disease   Renal/Genitourinary:      (-) renal disease   Endo:         Psychiatric:         Infectious Disease:         Malignancy:         Other:                            (-) no pre-eclampsia and gestational diabetes               Anesthesia Plan      History & Physical Review      ASA Status:  2 .        Plan for Epidural          Postoperative Care      Consents  Anesthetic plan, risks, benefits and alternatives discussed with:  Patient and Spouse..

## 2017-11-04 NOTE — H&P
[facility] Labor and Delivery History and Physical    Guera Abreu MRN# [id number]   Age: 27 year old YOB: 1990     Date of Admission:  2017    Primary care provider: Charlotte Green           Chief Complaint:   Guera Abreu is a 27 year old female who is 40w1d pregnant and being admitted for active labor management.          Pregnancy history:     OBSTETRIC HISTORY:    Obstetric History       T0      L0     SAB0   TAB0   Ectopic0   Multiple0   Live Births0       # Outcome Date GA Lbr Rik/2nd Weight Sex Delivery Anes PTL Lv   1 Current               Obstetric Comments   No hx of abnormal paps       EDC: Estimated Date of Delivery: Nov 3, 2017    Prenatal Labs:   Lab Results   Component Value Date    ABO A 2017    RH Neg 2017    AS neg 2017    HEPBANG neg 2017    CHPCRT  2010     Negative for C. trachomatis rRNA by transcription mediated amplification.   A negative result by transcription mediated amplification does not preclude the   presence of C. trachomatis infection because results are dependent on proper   and adequate collection, absence of inhibitors, and sufficient rRNA to be   detected.    GCPCRT  2010     Negative for N. gonorrhoeae rRNA by transcription mediated amplification.   A negative result by transcription mediated amplification does not preclude the   presence of N. gonorrhoeae infection because results are dependent on proper   and adequate collection, absence of inhibitors, and sufficient rRNA to be   detected.    TREPAB neg 2017    HGB 12.1 2017       GBS Status:   Lab Results   Component Value Date    GBS neg 2017       Active Problem List  Patient Active Problem List   Diagnosis     Other closed skull fracture without mention of intracranial injury, unspecified state of consciousness     Allergic rhinitis     Encounter for other general counseling or advice on  contraception     CARDIOVASCULAR SCREENING; LDL GOAL LESS THAN 160     Indication for care in labor or delivery       Medication Prior to Admission  Prescriptions Prior to Admission   Medication Sig Dispense Refill Last Dose     Prenatal Vit-Fe Fumarate-FA (PRENATAL MULTIVITAMIN  PLUS IRON) 27-0.8 MG TABS per tablet Take 1 tablet by mouth daily 100 tablet 3 11/3/2017 at Unknown time   .        Maternal Past Medical History:     Past Medical History:   Diagnosis Date     Allergic rhinitis, cause unspecified      Other closed skull fracture without mention of intracranial injury, unspecified state of consciousness      Tendonitis of wrist, right 4/1/2012                       Family History:   I have reviewed this patient's family history            Social History:     Social History   Substance Use Topics     Smoking status: Never Smoker     Smokeless tobacco: Never Used     Alcohol use No            Review of Systems:   C: NEGATIVE for fever, chills, change in weight  E/M: NEGATIVE for ear, mouth and throat problems  R: NEGATIVE for significant cough or SOB  CV: NEGATIVE for chest pain, palpitations or peripheral edema          Physical Exam:   Vitals were reviewed    Constitutional: Awake, alert, cooperative, no apparent distress, and appears stated age.  Eyes: Lids and lashes normal, pupils equal, round and reactive to light, extra ocular muscles intact, sclera clear, conjunctiva normal.  ENT: Normocephalic, without obvious abnormality, atramatic, sinuses nontender on palpation, external ears without lesions, oral pharynx with moist mucus membranes, tonsils without erythema or exudates, gums normal and good dentition.  Neck: Supple, symmetrical, trachea midline, no adenopathy, thyroid symmetric, not enlarged and no tenderness, skin normal.  Hematologic / Lymphatic: No cervical lymphadenopathy and no supraclavicular lymphadenopathy.  Back: Symmetric, no curvature, spinous processes are non-tender on palpation,  paraspinous muscles are non-tender on palpation, no costal vertebral tenderness.  Lungs: No increased work of breathing, good air exchange, clear to auscultation bilaterally, no crackles or wheezing.  Cardiovascular: Regular rate and rhythm, normal S1 and S2, no S3 or S4, and no murmur noted.  Chest / Breast: Breasts symmetrical, skin without lesion(s), no nipple retraction or dimpling, no nipple discharge, no masses palpated, no axillary or supraclavicular adenopathy.  Abdomen: No scars, normal bowel sounds, soft, non-distended, non-tender, no masses palpated, no hepatosplenomegally.  Genitourinary: No urethral discharge, normal external genitalia, no hernia.  Musculoskeletal: No redness, warmth, or swelling of the joints.  Full range of motion noted.  Motor strength is 5 out of 5 all extremities bilaterally.  Tone is normal.  Neurologic: Awake, alert, oriented to name, place and time.  Cranial nerves II-XII are grossly intact.  Motor is 5 out of 5 bilaterally.  Cerebellar finger to nose, heel to shin intact.  Sensory is intact.  Babinski down going, Romberg negative, and gait is normal.  Neuropsychiatric: Normal affect, mood, orientation, memory and insight.  Skin: No rashes, erythema, pallor, petechia or purpura.     Cervix:   Membranes: AROM   Dilation: 9   Effacement: 90%   Station:+1    Presentation:Cephalic  Fetal Heart Rate Tracing: AROM done and 5 min bradycardia while pt on her back. Gave pt O2, position changes, pit off and FSE placed  Tocometer: external monitor                       Assessment:   Guera Abreu is a 40w1d pregnant female admitted with active labor management.          Plan:   Admit - see IP orders  Pt req rebolus of epidural    Tiarra Sherman MD

## 2017-11-04 NOTE — IP AVS SNAPSHOT
MRN:[id number]                      After Visit Summary   11/4/2017    Guera Abreu    MRN: [id number]           Thank you!     Thank you for choosing [facility] for your care. Our goal is always to provide you with excellent care. Hearing back from our patients is one way we can continue to improve our services. Please take a few minutes to complete the written survey that you may receive in the mail after you visit with us. Thank you!        Patient Information     Date Of Birth          1990        About your hospital stay     You were admitted on:  November 4, 2017 You last received care in the:  [address]    You were discharged on:  November 6, 2017       Who to Call     For medical emergencies, please call 911.  For non-urgent questions about your medical care, please call your primary care provider or clinic, [phone]          Attending Provider     Provider Specialty    Tiarra Sherman MD OB/Gyn    Marylu Armstrong MD OB/Gyn       Primary Care Provider Office Phone # Fax #    Charlotte Green [phone]      Further instructions from your care team       Postpartum Vaginal Delivery Instructions    Activity       Ask family and friends for help when you need it.    Do not place anything in your vagina for 6 weeks.    You are not restricted on other activities, but take it easy for a few weeks to allow your body to recover from delivery.  You are able to do any activities you feel up to that point.    No driving until you have stopped taking your pain medications (usually two weeks after delivery).     Call your health care provider if you have any of these symptoms:       Increased pain, swelling, redness, or fluid around your stiches from an episiotomy or perineal tear.    A fever above 100.4 F (38 C) with or without chills when placing a thermometer under your tongue.    You soak a sanitary pad with blood  within 1 hour, or you see blood clots larger than a golf ball.    Bleeding that lasts more than 6 weeks.    Vaginal discharge that smells bad.    Severe pain, cramping or tenderness in your lower belly area.    A need to urinate more frequently (use the toilet more often), more urgently (use the toilet very quickly), or it burns when you urinate.    Nausea and vomiting.    Redness, swelling or pain around a vein in your leg.    Problems breastfeeding or a red or painful area on your breast.    Chest pain and cough or are gasping for air.    Problems coping with sadness, anxiety, or depression.  If you have any concerns about hurting yourself or the baby, call your provider immediately.     You have questions or concerns after you return home.     Keep your hands clean:  Always wash your hands before touching your perineal area and stitches.  This helps reduce your risk of infection.  If your hands aren't dirty, you may use an alcohol hand-rub to clean your hands. Keep your nails clean and short.      You should set up an appointment to see your doctor in 6 weeks after delivery for a postpartum exam.   You should also call if you develop any heavy vaginal bleeding worse than a menstrual period, or fever over 100.5 degrees, or vomiting or increased pain.    You should abstain from intercourse for six weeks after delivery. You should avoid a bath for 1 week after delivery but showering is ok. If you have any questions about yourself or the baby, feel free to call or if any urgent concerns after hours, please go to the emergency room.    Marylu Armstrong MD    Pending Results     No orders found from 11/2/2017 to 11/5/2017.            Statement of Approval     Ordered          11/06/17 0832  I have reviewed and agree with all the recommendations and orders detailed in this document.  EFFECTIVE NOW     Approved and electronically signed by:  Marylu Armstrong MD             Admission Information     Date  "& Time Provider Department Dept. Phone    11/4/2017 Marylu Armstrong MD [address] [phone]      Your Vitals Were     Blood Pressure Temperature Respirations Height Weight Last Period    120/75 98.8  F (37.1  C) (Oral) 16 1.549 m (5' 1\") 79.4 kg (175 lb) 12/24/2016 (Exact Date)    Pulse Oximetry BMI (Body Mass Index)                95% 33.07 kg/m2          MyChart Information     [employer] gives you secure access to your electronic health record. If you see a primary care provider, you can also send messages to your care team and make appointments. If you have questions, please call your primary care clinic.  If you do not have a primary care provider, please call [phone] and they will assist you.        Care EveryWhere ID     This is your Care EveryWhere ID. This could be used by other organizations to access your [facility] medical records  [id number]        Equal Access to Services     LAKESHA SOSA AH: Hadii aad ku hadasho Soileana, waaxda luqadaha, qaybta kaalmada adeegyavesta, cecelia herrera . So [facility] [phone].    ATENCIÓN: Si habla español, tiene a cormier disposición servicios gratuitos de asistencia lingüística. Llame al [phone].    We comply with applicable federal civil rights laws and [place] laws. We do not discriminate on the basis of race, color, national origin, age, disability, sex, sexual orientation, or gender identity.               Review of your medicines      START taking        Dose / Directions    ibuprofen 600 MG tablet   Commonly known as:  ADVIL/MOTRIN        Dose:  600 mg   Take 1 tablet (600 mg) by mouth every 6 hours as needed for moderate pain   Quantity:  90 tablet   Refills:  1         CONTINUE these medicines which have NOT CHANGED        Dose / Directions    prenatal multivitamin plus iron 27-0.8 MG Tabs per tablet        Dose:  1 tablet   Take 1 tablet by mouth daily   Quantity:  100 tablet   Refills:  3       "      Where to get your medicines      Some of these will need a paper prescription and others can be bought over the counter. Ask your nurse if you have questions.     Bring a paper prescription for each of these medications     ibuprofen 600 MG tablet                Protect others around you: Learn how to safely use, store and throw away your medicines at www.disposemymeds.org.             Medication List: This is a list of all your medications and when to take them. Check marks below indicate your daily home schedule. Keep this list as a reference.      Medications           Morning Afternoon Evening Bedtime As Needed    ibuprofen 600 MG tablet   Commonly known as:  ADVIL/MOTRIN   Take 1 tablet (600 mg) by mouth every 6 hours as needed for moderate pain   Last time this was given:  800 mg on 11/6/2017 11:12 AM                                prenatal multivitamin plus iron 27-0.8 MG Tabs per tablet   Take 1 tablet by mouth daily

## 2017-11-04 NOTE — IP AVS SNAPSHOT
[address]    [place] [address]    Phone:  [phone]                                       After Visit Summary   11/4/2017    Guera Abreu    MRN: [id number]           After Visit Summary Signature Page     I have received my discharge instructions, and my questions have been answered. I have discussed any challenges I see with this plan with the nurse or doctor.    ..........................................................................................................................................  Patient/Patient Representative Signature      ..........................................................................................................................................  Patient Representative Print Name and Relationship to Patient    ..................................................               ................................................  Date                                            Time    ..........................................................................................................................................  Reviewed by Signature/Title    ...................................................              ..............................................  Date                                                            Time

## 2017-11-04 NOTE — ANESTHESIA PROCEDURE NOTES
Peripheral nerve/Neuraxial procedure note : epidural catheter  Pre-Procedure  Performed by PHILOMENA YUAN  Location: OB      Pre-Anesthestic Checklist: patient identified, IV checked, risks and benefits discussed, informed consent, monitors and equipment checked, pre-op evaluation and at physician/surgeon's request    Timeout  Correct Patient: Yes   Correct Procedure: Yes   Correct Site: Yes   Correct Laterality: N/A   Correct Position: Yes   Site Marked: N/A   .   Procedure Documentation    .    Procedure:    Epidural catheter.  Insertion Site:L3-4  (midline approach) Injection technique: LORT saline   Local skin infiltrated with 3 mL of 1% lidocaine.  CHRISTI at 4 cm     Patient Prep;mask, sterile gloves, povidone-iodine 7.5% surgical scrub, patient draped.  .  Needle: [employer] needle Needle Gauge: 17.    Needle Length (Inches) 3.5  # of attempts: 1 and # of redirects: : 0. .   Catheter: 19 G . .  Catheter threaded easily  3 cm epidural space.  7 cm at skin.   .    Assessment/Narrative  Paresthesias: No.  .  .  Aspiration negative for heme or CSF  . Test dose of 3 mL lidocaine 1.5% w/ 1:200,000 epinephrine at 12:27.  Test dose negative for signs of intravascular, subdural or intrathecal injection. Comments:  Pt tolerated well.   Taped sterile and secure.   FHTs stable post-procedure.   No complications.

## 2017-11-05 LAB
BLOOD BANK CMNT PATIENT-IMP: NORMAL
T PALLIDUM IGG+IGM SER QL: NEGATIVE

## 2017-11-05 PROCEDURE — 12000037 ZZH R&B POSTPARTUM INTERMEDIATE

## 2017-11-05 PROCEDURE — 25000132 ZZH RX MED GY IP 250 OP 250 PS 637: Performed by: OBSTETRICS & GYNECOLOGY

## 2017-11-05 RX ORDER — IBUPROFEN 400 MG/1
400-800 TABLET, FILM COATED ORAL EVERY 6 HOURS PRN
Qty: 30 TABLET | Refills: 0 | Status: SHIPPED | OUTPATIENT
Start: 2017-11-05 | End: 2017-11-06

## 2017-11-05 RX ADMIN — IBUPROFEN 800 MG: 400 TABLET ORAL at 21:52

## 2017-11-05 RX ADMIN — IBUPROFEN 800 MG: 400 TABLET ORAL at 09:44

## 2017-11-05 RX ADMIN — IBUPROFEN 800 MG: 400 TABLET ORAL at 15:32

## 2017-11-05 RX ADMIN — ACETAMINOPHEN 650 MG: 325 TABLET, FILM COATED ORAL at 09:44

## 2017-11-05 RX ADMIN — ACETAMINOPHEN 650 MG: 325 TABLET, FILM COATED ORAL at 15:33

## 2017-11-05 RX ADMIN — SENNOSIDES AND DOCUSATE SODIUM 1 TABLET: 8.6; 5 TABLET ORAL at 08:31

## 2017-11-05 RX ADMIN — SENNOSIDES AND DOCUSATE SODIUM 1 TABLET: 8.6; 5 TABLET ORAL at 21:53

## 2017-11-05 RX ADMIN — ACETAMINOPHEN 650 MG: 325 TABLET, FILM COATED ORAL at 21:53

## 2017-11-05 RX ADMIN — ACETAMINOPHEN 650 MG: 325 TABLET, FILM COATED ORAL at 03:56

## 2017-11-05 RX ADMIN — IBUPROFEN 800 MG: 400 TABLET ORAL at 03:56

## 2017-11-05 NOTE — PROGRESS NOTES
Admission date: 2017  Delivery date:  17  Place of delivery: [facility]    The patient is a 27 year old  at 40w1d  wks gestation admitted to labor and delivery in active labor.  Her  course was uncomplicated and under the direction of Dr Armstrong.  The estimate fetal weight is 7.5 lbs.      For pain management she had an epidural with good relief.  Pitocin was used.   Membranes were artificially ruptured and the fluid was mec stained.    She progressed to complete dilation and had excellent maternal expulsive efforts, and at 8:37 she delivered a viable female infant weight pending with apgars of 8 at 1 min and 9 at 5 minutes, via a normal spontaneous vaginal delivery over an intact perineum.  The infant was vigorous, and mouth and nose were bulb suctioned upon delivery.  The infant was handed off to her parents and the nursery team in attendance. The NNP was present but not needed.    The placenta delivered spontaneously and intact.  The uterus was made firm with IV pitocin and uterine massage.  The estimated blood loss was 300mL.    The cervix and vagina were inspected.  There was a first degree perineal laceration which was repaired with 3-0 vicryl assuring hemostasis and close approximation. A right labial laceration was noted and reapproximated with 3-0 vicryl in a figure X fashion.  The patient tolerated this well.     During labor the fetal heart tones were tier 2.    Mother and baby did well and went to normal postpartum and  nursery.    GBS was negative.

## 2017-11-05 NOTE — ANESTHESIA POSTPROCEDURE EVALUATION
Patient: Guera Abreu    * No procedures listed *    Diagnosis:* No pre-op diagnosis entered *  Diagnosis Additional Information: No value filed.    Anesthesia Type:  No value filed.    Note:  Anesthesia Post Evaluation    Patient location during evaluation: Floor  Patient participation: Able to fully participate in evaluation  Level of consciousness: awake  Pain management: adequate  Airway patency: patent  Cardiovascular status: acceptable  Respiratory status: acceptable  Hydration status: acceptable  PONV: none     Anesthetic complications: None    Comments: Uncomplicated LAURENCE        Last vitals:  Vitals:    11/05/17 1029 11/05/17 1130 11/05/17 1533   BP:  107/75 111/69   Resp: 16 16 16   Temp:  36.9  C (98.4  F) 36.9  C (98.5  F)   SpO2:            Electronically Signed By: Bessy Wood MD  November 5, 2017  4:41 PM

## 2017-11-05 NOTE — LACTATION NOTE
Initial Lactation visit. Hand out given. Recommend unlimited, frequent breast feedings: At least 8 - 12 times every 24 hours. Avoid pacifiers and supplementation with formula unless medically indicated. Explained benefits of holding baby skin on skin to help promote better breastfeeding outcomes.  Assisted with feeding.  Infant latching shallow to breast.  Educated Guera on positioning and latching.  She was able to practice getting baby latched more deeply.  Infant started to gulp with feeding after latched better.  Reviewed second night and clusterfeeding.   Will revisit as needed.    Romi Porter RN, IBCLC

## 2017-11-05 NOTE — PROGRESS NOTES
"Guera Stephens Lois  2017    S: pt doing well.  Pain well controlled,  Ambulating without difficulty.  Tolerating po intake.  Decreased lochia.  Breast/Bottle feeding.    O: /56  Temp 97.7  F (36.5  C) (Oral)  Resp 16  Ht 1.549 m (5' 1\")  Wt 79.4 kg (175 lb)  LMP 2016 (Exact Date)  SpO2 95%  Breastfeeding? Unknown  BMI 33.07 kg/m2    Recent Labs  Lab 17  1125   HGB 12.1     Abdomen: soft, non tender, fundus firm below the umbilicus  Ext: non tender, no edema or erythema    A/P: s/p  PPD #1  Doing well  Continue routine post partum care  Discharge planning for tomorrow    Dax Iglesiassandy    "

## 2017-11-06 VITALS
HEIGHT: 61 IN | SYSTOLIC BLOOD PRESSURE: 120 MMHG | WEIGHT: 175 LBS | RESPIRATION RATE: 16 BRPM | TEMPERATURE: 98.8 F | DIASTOLIC BLOOD PRESSURE: 75 MMHG | OXYGEN SATURATION: 95 % | BODY MASS INDEX: 33.04 KG/M2

## 2017-11-06 PROCEDURE — 25000132 ZZH RX MED GY IP 250 OP 250 PS 637: Performed by: OBSTETRICS & GYNECOLOGY

## 2017-11-06 RX ORDER — IBUPROFEN 600 MG/1
600 TABLET, FILM COATED ORAL EVERY 6 HOURS PRN
Qty: 90 TABLET | Refills: 1 | Status: SHIPPED | OUTPATIENT
Start: 2017-11-06 | End: 2019-01-28

## 2017-11-06 RX ADMIN — ACETAMINOPHEN 650 MG: 325 TABLET, FILM COATED ORAL at 04:42

## 2017-11-06 RX ADMIN — SENNOSIDES AND DOCUSATE SODIUM 1 TABLET: 8.6; 5 TABLET ORAL at 08:56

## 2017-11-06 RX ADMIN — IBUPROFEN 800 MG: 400 TABLET ORAL at 04:42

## 2017-11-06 RX ADMIN — ACETAMINOPHEN 650 MG: 325 TABLET, FILM COATED ORAL at 11:11

## 2017-11-06 RX ADMIN — IBUPROFEN 800 MG: 400 TABLET ORAL at 11:12

## 2017-11-06 NOTE — DISCHARGE INSTRUCTIONS
Postpartum Vaginal Delivery Instructions    Activity       Ask family and friends for help when you need it.    Do not place anything in your vagina for 6 weeks.    You are not restricted on other activities, but take it easy for a few weeks to allow your body to recover from delivery.  You are able to do any activities you feel up to that point.    No driving until you have stopped taking your pain medications (usually two weeks after delivery).     Call your health care provider if you have any of these symptoms:       Increased pain, swelling, redness, or fluid around your stiches from an episiotomy or perineal tear.    A fever above 100.4 F (38 C) with or without chills when placing a thermometer under your tongue.    You soak a sanitary pad with blood within 1 hour, or you see blood clots larger than a golf ball.    Bleeding that lasts more than 6 weeks.    Vaginal discharge that smells bad.    Severe pain, cramping or tenderness in your lower belly area.    A need to urinate more frequently (use the toilet more often), more urgently (use the toilet very quickly), or it burns when you urinate.    Nausea and vomiting.    Redness, swelling or pain around a vein in your leg.    Problems breastfeeding or a red or painful area on your breast.    Chest pain and cough or are gasping for air.    Problems coping with sadness, anxiety, or depression.  If you have any concerns about hurting yourself or the baby, call your provider immediately.     You have questions or concerns after you return home.     Keep your hands clean:  Always wash your hands before touching your perineal area and stitches.  This helps reduce your risk of infection.  If your hands aren't dirty, you may use an alcohol hand-rub to clean your hands. Keep your nails clean and short.      You should set up an appointment to see your doctor in 6 weeks after delivery for a postpartum exam.   You should also call if you develop any heavy vaginal bleeding  worse than a menstrual period, or fever over 100.5 degrees, or vomiting or increased pain.    You should abstain from intercourse for six weeks after delivery. You should avoid a bath for 1 week after delivery but showering is ok. If you have any questions about yourself or the baby, feel free to call or if any urgent concerns after hours, please go to the emergency room.    Marylu Armstrong MD

## 2017-11-06 NOTE — PROGRESS NOTES
"Guera Abreu  2017    S: Pt doing well with no acute events overnight.  Pain well controlled;  ambulating without difficulty; tolerating po intake; passing flatus.  Denies SOB, CP, HA, nausea/vomiting, fevers/chills.  Decreased lochia.  Breastfeeding without difficulty.    O: /70  Temp 98.4  F (36.9  C) (Oral)  Resp 16  Ht 1.549 m (5' 1\")  Wt 79.4 kg (175 lb)  LMP 2016 (Exact Date)  SpO2 95%  Breastfeeding? Unknown  BMI 33.07 kg/m2    Recent Labs  Lab 17  1125   HGB 12.1     Abdomen: soft, non tender, fundus firm 2 cm below the umbilicus  Ext: non tender, no edema or erythema    A/P: s/p  PPD #1 - doing well  Continue routine post partum care  Discussed contraceptive options, which will be readdressed at 6 week post-partum appointment.  Discharge planning for today.    Marylu Armstrong MD    "

## 2017-11-06 NOTE — LACTATION NOTE
Routine visit. Asked to see Guera regarding questions about concerns over milk supply and latching baby.  She latched and suckled well soon after birth and has cluster fed overnight.    Currently she  is latched and suckling vigorously with audible swallowing noted.  Mom is concerned that she does not have enough milk.   We reviewed general breastfeeding information.  Explained how milk supply is established and maintained.  Showed how to position baby so that she is able to latch deeply.  Repositioned baby and nipple discomfort decreased.  Showed parents how to identify and correct a poor latch.  Encouraged frequent ad drew feedings to equal 8-12 feedings/24 hours and fill out feeding log to keep track of number of times fed.   Parents request pacifier for  infant: parents informed about impact of pacifier on breastfeeding success (latch problems, nipple confusion, lower milk supply) and AAP best practice recommendation not to use pacifier for  baby before one month of age.  Parents instructed on other soothing techniques for fussy baby. Getting ready for discharge.  Plan: Watch for feeding cues and feed every 2-3 hours and/or on demand. Continue to use feeding log to track intake and appropriate voids and stools. Take feeding log to first follow up appointment or weight check. Encourage skin to skin to promote frequent feedings, thermoregulation and bonding. Follow-up with healthcare provider or lactation consultant for questions or concerns.  No further questions at this time. Ivana Magaña RNC, IBCLC

## 2019-01-28 ENCOUNTER — OFFICE VISIT (OUTPATIENT)
Dept: FAMILY MEDICINE | Facility: CLINIC | Age: 29
End: 2019-01-28
Payer: COMMERCIAL

## 2019-01-28 VITALS
SYSTOLIC BLOOD PRESSURE: 108 MMHG | BODY MASS INDEX: 27.94 KG/M2 | HEART RATE: 86 BPM | WEIGHT: 148 LBS | OXYGEN SATURATION: 97 % | TEMPERATURE: 98.4 F | DIASTOLIC BLOOD PRESSURE: 68 MMHG | HEIGHT: 61 IN

## 2019-01-28 DIAGNOSIS — N91.2 LACK OF MENSES: Primary | ICD-10-CM

## 2019-01-28 LAB — HCG UR QL: NEGATIVE

## 2019-01-28 PROCEDURE — 99213 OFFICE O/P EST LOW 20 MIN: CPT | Performed by: FAMILY MEDICINE

## 2019-01-28 PROCEDURE — 81025 URINE PREGNANCY TEST: CPT | Performed by: FAMILY MEDICINE

## 2019-01-28 ASSESSMENT — MIFFLIN-ST. JEOR: SCORE: 1343.7

## 2019-01-28 NOTE — PATIENT INSTRUCTIONS
[facility] [place]                        To reach your care team during and after hours:   [phone]  To reach our pharmacy:        [phone]    Clinic Hours                        Our clinic hours are:    Monday   7:30 am to 7:00 pm                  Tuesday through Friday 7:30 am to 5:00 pm                             Saturday   8:00 am to 12:00 pm      Sunday   Closed      Pharmacy Hours                        Our pharmacy hours are:    Monday   8:30 am to 7:00 pm       Tuesday to Friday  8:30 am to 6:00 pm                       Saturday    9:00 am to 1:00 pm              Sunday    Closed              There is also information available at our web site:  [email or web address]    If your provider ordered any lab tests and you do not receive the results within 10 business days, please call the clinic.    If you need a medication refill please contact your pharmacy.  Please allow 2-3 business days for your refill to be completed.    Our clinic offers telephone visits and e visits.  Please ask one of your team members to explain more.      Use [employer] (secure email communication and access to your chart) to send your primary care provider a message or make an appointment. Ask someone on your Team how to sign up for [employer].  Immunizations                      Immunization History   Administered Date(s) Administered     HPV 05/22/2007, 08/03/2007, 12/27/2007     HepB 09/21/1993, 07/03/2003, 10/09/2003     Hib (PRP-T) 1991, 1991, 01/14/1992, 10/05/1992     Historical DTP/aP 1991, 1991, 01/14/1992, 09/21/1993, 07/09/1996     Influenza (IIV3) PF 10/29/2008     MMR 10/05/1992, 07/03/2003     Mantoux Tuberculin Skin Test 04/03/1992     OPV, trivalent, live 1991, 1991, 09/21/1993, 07/09/1996     TDAP Vaccine (Boostrix) 12/20/2010        Health Maintenance                         Health Maintenance Due   Topic Date Due     Depression Assessment 2 - yearly  02/27/2018      Flu Vaccine (1) 09/01/2018

## 2019-01-28 NOTE — PROGRESS NOTES
SUBJECTIVE:   Guera Abreu is a 28 year old female who presents to clinic today for the following health issues:    Pregnancy Confirmation -     Pt pregnancy History: G  1 and P  1, LMP 2019      History:    Positive Pregnancy test at home:YES- very light  Planned or unplanned pregnancy: Planned, Desired  Periods regular or irregular: irregular  Taking Prenatal vitamins: no     Accompanying Signs & Symptoms:   Bleeding: no   Cramping: no   Nausea and vomiting: no   Breast Tenderness: no    Patient reports last period was 2019. Patient denies spotting, breast tenderness, and nausea.     Problem list and histories reviewed & adjusted, as indicated.  Additional history: as documented    BP Readings from Last 3 Encounters:   19 108/68   17 120/75   17 112/72       body mass index is 27.96 kg/m .    Wt Readings from Last 4 Encounters:   19 67.1 kg (148 lb)   17 79.4 kg (175 lb)   17 74.8 kg (165 lb)   17 69.4 kg (153 lb)       Health Maintenance    Health Maintenance Due   Topic Date Due     PHQ-2 Q1 YR  2018     INFLUENZA VACCINE (1) 2018       Current Problem List    Patient Active Problem List   Diagnosis     Other closed skull fracture without mention of intracranial injury, unspecified state of consciousness     Allergic rhinitis     Encounter for other general counseling or advice on contraception     CARDIOVASCULAR SCREENING; LDL GOAL LESS THAN 160     Indication for care in labor or delivery      (normal spontaneous vaginal delivery)       Past Medical History    Past Medical History:   Diagnosis Date     Allergic rhinitis, cause unspecified      Other closed skull fracture without mention of intracranial injury, unspecified state of consciousness      Tendonitis of wrist, right 2012       Past Surgical History    Past Surgical History:   Procedure Laterality Date      wisdom teeth       NO HISTORY OF SURGERY         Current  Medications    No current outpatient medications on file.       Allergies    No Known Allergies    Immunizations    Immunization History   Administered Date(s) Administered     HPV 2007, 2007, 2007     HepB 1993, 2003, 10/09/2003     Hib (PRP-T) 1991, 1991, 1992, 10/05/1992     Historical DTP/aP 1991, 1991, 1992, 1993, 1996     Influenza (IIV3) PF 10/29/2008     MMR 10/05/1992, 2003     Mantoux Tuberculin Skin Test 1992     OPV, trivalent, live 1991, 1991, 1993, 1996     TDAP Vaccine (Boostrix) 2010       Family History    Family History   Problem Relation Age of Onset     Hypertension Maternal Grandmother      C.A.D. Maternal Grandfather          age 80+ of heart attack     C.A.BENNETT Paternal Grandmother         bypass surgery     Cerebrovascular Disease Paternal Grandfather           age 72 of stroke      Gastrointestinal Disease Father         colitis       Social History    Social History     Socioeconomic History     Marital status: Single     Spouse name: Not on file     Number of children: 0     Years of education: 11     Highest education level: Not on file   Social Needs     Financial resource strain: Not on file     Food insecurity - worry: Not on file     Food insecurity - inability: Not on file     Transportation needs - medical: Not on file     Transportation needs - non-medical: Not on file   Occupational History     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[employer] [place]      Employer: STUDENT   Tobacco Use     Smoking status: Never Smoker     Smokeless tobacco: Never Used   Substance and Sexual Activity     Alcohol use: No     Drug use: No     Sexual activity: Yes     Partners: Male     Birth control/protection: None     Comment: Single partner and practices safe sex   Other Topics Concern      Service No     Blood Transfusions No     Caffeine Concern No     Occupational Exposure Not Asked      "Hobby Hazards Not Asked     Sleep Concern Not Asked     Stress Concern Not Asked     Weight Concern Not Asked     Special Diet Not Asked     Back Care Not Asked     Exercise Yes     Comment: [employer].      Bike Helmet Not Asked     Seat Belt Yes     Comment: always      Self-Exams Yes     Comment: SBE encouraged monthly      Parent/sibling w/ CABG, MI or angioplasty before 65F 55M? No   Social History Narrative    Menarche at age 13.        All above reviewed and updated, all stable unless otherwise noted    Recent labs reviewed    ROS:  Constitutional, HEENT, cardiovascular, pulmonary, GI, , musculoskeletal, neuro, skin, endocrine and psych systems are negative, except as otherwise noted.    OBJECTIVE:                                                    /68   Pulse 86   Temp 98.4  F (36.9  C) (Oral)   Ht 1.549 m (5' 1\")   Wt 67.1 kg (148 lb)   LMP 01/01/2019 (Exact Date)   SpO2 97%   BMI 27.96 kg/m    Body mass index is 27.96 kg/m .  GENERAL: healthy, alert and no distress  EYES: Eyes grossly normal to inspection  HENT:ear canals and TM's normal upon viewing with otoscope, nose and mouth without ulcers or lesions upon viewing with otoscope  NECK: no tenderness, no adenopathy, no asymmetry, no masses, no stiffness; thyroid- normal to palpation  RESP: lungs clear to auscultation - no rales, no rhonchi, no wheezes  CV: regular rates and rhythm, normal S1 S2, no S3 or S4 and no murmur, no click or rub -  ABDOMEN: soft, no tenderness, no  hepatosplenomegaly, no masses, normal bowel sounds  MS: extremities- no gross deformities noted, no edema  SKIN: no suspicious lesions, no rashes  NEURO: strength and tone- normal, sensory exam- grossly normal, mentation- intact, speech- normal  BACK: no CVA tenderness, no paralumbar tenderness  PSYCH: Alert and oriented times 3; speech- coherent , normal rate and volume; able to articulate logical thoughts, able to abstract reason, no tangential thoughts, no " hallucinations or delusions, affect- normal    DIAGNOSTICS/PROCEDURES:                                                      Results for orders placed or performed in visit on 01/28/19 (from the past 24 hour(s))   HCG qualitative urine   Result Value Ref Range    HCG Qual Urine Negative NEG^Negative        ASSESSMENT/PLAN:                                                        ICD-10-CM    1. Lack of menses N91.2 HCG qualitative urine     Beta HCG Qual, Urine - [facility] and [place] ([id number])     HCG Qual, Blood ([id number])     CANCELED: Beta HCG Qual, Urine - [facility] and [place] ([id number])     CANCELED: HCG Qual, Blood ([id number])     Discussed treatment/modality options, including risk and benefits, she desires advised 1 multivitamin per day, advised dentist every 6 months and advised diet and exercise. All diagnosis above reviewed and noted above, otherwise stable.  See [facility] orders for further details.  Follow up as needed.    Patient presented today for possible confirmation of pregnancy. Patient's HCG was negative. Follow up in 1 week for HCG test to recheck possibility of pregnancy.     Health Maintenance Due   Topic Date Due     PHQ-2 Q1 YR  02/27/2018     INFLUENZA VACCINE (1) 09/01/2018     This document serves as a record of the services and decisions personally performed and made by Everardo Duran MD. It was created on his behalf by Kranthi Trevizo, a trained medical scribe. The creation of this document is based on the provider's statements to the medical scribe.  Kranthi Trevizo January 28, 2019 3:39 PM     The information in this document, created by the medical scribe for me, accurately reflects the services I personally performed and the decisions made by me. I have reviewed and approved this document for accuracy prior to leaving the patient care area.  January 28, 2019            Everardo Duran MD [address]  [address] [phone] Fax

## 2019-03-25 LAB
ABO + RH BLD: NORMAL
ABO + RH BLD: NORMAL
BLD GP AB SCN SERPL QL: NORMAL
HBV SURFACE AG SERPL QL IA: NONREACTIVE
HIV 1+2 AB+HIV1 P24 AG SERPL QL IA: NONREACTIVE
RUBELLA ANTIBODY IGG QUANTITATIVE: NORMAL IU/ML
TREPONEMA ANTIBODIES: NONREACTIVE

## 2019-05-20 ENCOUNTER — TRANSFERRED RECORDS (OUTPATIENT)
Dept: HEALTH INFORMATION MANAGEMENT | Facility: CLINIC | Age: 29
End: 2019-05-20

## 2019-09-09 LAB — GROUP B STREP PCR: NEGATIVE

## 2019-09-19 ENCOUNTER — HOSPITAL ENCOUNTER (OUTPATIENT)
Facility: CLINIC | Age: 29
Discharge: HOME | End: 2019-09-20
Attending: OBSTETRICS & GYNECOLOGY | Admitting: OBSTETRICS & GYNECOLOGY
Payer: COMMERCIAL

## 2019-09-19 PROBLEM — Z36.89 ENCOUNTER FOR TRIAGE IN PREGNANT PATIENT: Status: ACTIVE | Noted: 2019-09-19

## 2019-09-19 LAB
ABO + RH BLD: ABNORMAL
ABO + RH BLD: ABNORMAL
BLD GP AB INVEST PLASRBC-IMP: ABNORMAL
BLD GP AB SCN SERPL QL: ABNORMAL
BLOOD BANK CMNT PATIENT-IMP: ABNORMAL
BLOOD BANK CMNT PATIENT-IMP: ABNORMAL
SPECIMEN EXP DATE BLD: ABNORMAL

## 2019-09-19 PROCEDURE — 36415 COLL VENOUS BLD VENIPUNCTURE: CPT | Performed by: OBSTETRICS & GYNECOLOGY

## 2019-09-19 PROCEDURE — 86901 BLOOD TYPING SEROLOGIC RH(D): CPT | Performed by: OBSTETRICS & GYNECOLOGY

## 2019-09-19 PROCEDURE — 25800030 ZZH RX IP 258 OP 636: Performed by: OBSTETRICS & GYNECOLOGY

## 2019-09-19 PROCEDURE — 86850 RBC ANTIBODY SCREEN: CPT | Performed by: OBSTETRICS & GYNECOLOGY

## 2019-09-19 PROCEDURE — 86780 TREPONEMA PALLIDUM: CPT | Performed by: OBSTETRICS & GYNECOLOGY

## 2019-09-19 PROCEDURE — 59025 FETAL NON-STRESS TEST: CPT

## 2019-09-19 PROCEDURE — 86870 RBC ANTIBODY IDENTIFICATION: CPT | Performed by: OBSTETRICS & GYNECOLOGY

## 2019-09-19 PROCEDURE — G0463 HOSPITAL OUTPT CLINIC VISIT: HCPCS | Mod: 25

## 2019-09-19 PROCEDURE — 12000000 ZZH R&B MED SURG/OB

## 2019-09-19 PROCEDURE — 86900 BLOOD TYPING SEROLOGIC ABO: CPT | Performed by: OBSTETRICS & GYNECOLOGY

## 2019-09-19 RX ORDER — OXYTOCIN/0.9 % SODIUM CHLORIDE 30/500 ML
100-340 PLASTIC BAG, INJECTION (ML) INTRAVENOUS CONTINUOUS PRN
Status: DISCONTINUED | OUTPATIENT
Start: 2019-09-19 | End: 2019-09-20 | Stop reason: HOSPADM

## 2019-09-19 RX ORDER — METHYLERGONOVINE MALEATE 0.2 MG/ML
200 INJECTION INTRAVENOUS
Status: DISCONTINUED | OUTPATIENT
Start: 2019-09-19 | End: 2019-09-20 | Stop reason: HOSPADM

## 2019-09-19 RX ORDER — OXYTOCIN 10 [USP'U]/ML
10 INJECTION, SOLUTION INTRAMUSCULAR; INTRAVENOUS
Status: DISCONTINUED | OUTPATIENT
Start: 2019-09-19 | End: 2019-09-20 | Stop reason: HOSPADM

## 2019-09-19 RX ORDER — FENTANYL CITRATE 50 UG/ML
50-100 INJECTION, SOLUTION INTRAMUSCULAR; INTRAVENOUS
Status: DISCONTINUED | OUTPATIENT
Start: 2019-09-19 | End: 2019-09-20 | Stop reason: HOSPADM

## 2019-09-19 RX ORDER — PRENATAL VIT/IRON FUM/FOLIC AC 27MG-0.8MG
1 TABLET ORAL DAILY
COMMUNITY

## 2019-09-19 RX ORDER — ACETAMINOPHEN 325 MG/1
650 TABLET ORAL EVERY 4 HOURS PRN
Status: DISCONTINUED | OUTPATIENT
Start: 2019-09-19 | End: 2019-09-20 | Stop reason: HOSPADM

## 2019-09-19 RX ORDER — SODIUM CHLORIDE, SODIUM LACTATE, POTASSIUM CHLORIDE, CALCIUM CHLORIDE 600; 310; 30; 20 MG/100ML; MG/100ML; MG/100ML; MG/100ML
INJECTION, SOLUTION INTRAVENOUS CONTINUOUS
Status: DISCONTINUED | OUTPATIENT
Start: 2019-09-19 | End: 2019-09-20 | Stop reason: HOSPADM

## 2019-09-19 RX ORDER — ONDANSETRON 2 MG/ML
4 INJECTION INTRAMUSCULAR; INTRAVENOUS EVERY 6 HOURS PRN
Status: DISCONTINUED | OUTPATIENT
Start: 2019-09-19 | End: 2019-09-20 | Stop reason: HOSPADM

## 2019-09-19 RX ORDER — OXYCODONE AND ACETAMINOPHEN 5; 325 MG/1; MG/1
1 TABLET ORAL
Status: DISCONTINUED | OUTPATIENT
Start: 2019-09-19 | End: 2019-09-20 | Stop reason: HOSPADM

## 2019-09-19 RX ORDER — NALOXONE HYDROCHLORIDE 0.4 MG/ML
.1-.4 INJECTION, SOLUTION INTRAMUSCULAR; INTRAVENOUS; SUBCUTANEOUS
Status: DISCONTINUED | OUTPATIENT
Start: 2019-09-19 | End: 2019-09-20 | Stop reason: HOSPADM

## 2019-09-19 RX ORDER — IBUPROFEN 400 MG/1
800 TABLET, FILM COATED ORAL
Status: DISCONTINUED | OUTPATIENT
Start: 2019-09-19 | End: 2019-09-20 | Stop reason: HOSPADM

## 2019-09-19 RX ORDER — CARBOPROST TROMETHAMINE 250 UG/ML
250 INJECTION, SOLUTION INTRAMUSCULAR
Status: DISCONTINUED | OUTPATIENT
Start: 2019-09-19 | End: 2019-09-20 | Stop reason: HOSPADM

## 2019-09-19 RX ORDER — LIDOCAINE 40 MG/G
CREAM TOPICAL
Status: DISCONTINUED | OUTPATIENT
Start: 2019-09-19 | End: 2019-09-20 | Stop reason: HOSPADM

## 2019-09-19 RX ADMIN — SODIUM CHLORIDE, POTASSIUM CHLORIDE, SODIUM LACTATE AND CALCIUM CHLORIDE 125 ML/HR: 600; 310; 30; 20 INJECTION, SOLUTION INTRAVENOUS at 20:29

## 2019-09-19 ASSESSMENT — MIFFLIN-ST. JEOR: SCORE: 1497.46

## 2019-09-19 ASSESSMENT — ACTIVITIES OF DAILY LIVING (ADL)
FALL_HISTORY_WITHIN_LAST_SIX_MONTHS: NO
DRESS: 0-->INDEPENDENT
TOILETING: 0-->INDEPENDENT
SWALLOWING: 0-->SWALLOWS FOODS/LIQUIDS WITHOUT DIFFICULTY
COGNITION: 0 - NO COGNITION ISSUES REPORTED
BATHING: 0-->INDEPENDENT
RETIRED_COMMUNICATION: 0-->UNDERSTANDS/COMMUNICATES WITHOUT DIFFICULTY
TRANSFERRING: 0-->INDEPENDENT
AMBULATION: 0-->INDEPENDENT
RETIRED_EATING: 0-->INDEPENDENT

## 2019-09-19 NOTE — PLAN OF CARE
Spoke to , orders received to observe pt and recheck cervix around 1900, and update him with plan.      1800.  Pt up to void, bright red vaginal bleeding noted on chux pad, roughly 5 inch by 2 inch in size.  After pt returned from bathroom pt, pt had noticed some vaginal bleeding while in bathroom, small amount noted when pt sat down back in back.

## 2019-09-19 NOTE — PLAN OF CARE
1620.   Multip, 37 2/7 gestation, here from work after experiencing some vaginal bleeding after using the bathroom before leaving work.  No clots, but pt states it was like a light period.  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  Monitors applied with pts consent.  Pt denies constipation and hemorrhoids.

## 2019-09-20 VITALS
HEIGHT: 61 IN | DIASTOLIC BLOOD PRESSURE: 68 MMHG | SYSTOLIC BLOOD PRESSURE: 100 MMHG | RESPIRATION RATE: 16 BRPM | WEIGHT: 183 LBS | TEMPERATURE: 98.1 F | BODY MASS INDEX: 34.55 KG/M2

## 2019-09-20 LAB — T PALLIDUM AB SER QL: NONREACTIVE

## 2019-09-20 PROCEDURE — 25800030 ZZH RX IP 258 OP 636: Performed by: OBSTETRICS & GYNECOLOGY

## 2019-09-20 RX ADMIN — SODIUM CHLORIDE, POTASSIUM CHLORIDE, SODIUM LACTATE AND CALCIUM CHLORIDE 125 ML/HR: 600; 310; 30; 20 INJECTION, SOLUTION INTRAVENOUS at 03:38

## 2019-09-20 NOTE — PLAN OF CARE
Vital signs stable, SVE 2-3/70%/-3/intact. FHT WDL. Mild contractions every 4-5 mins. Pain has subsided. Dr. Sanchez  updated on patient's status, verbal order for discharge given, will come to discharge patient around 0800.

## 2019-09-20 NOTE — PLAN OF CARE
IUP 37=2 wks AOG, G [id number]. admitted to room 212.  Prenatal record reviewed. Vital signs per doc flowsheet. Fetal movement present. Patient reports vaginal bleeding as reason for admission. Received report form KEN Daily RN. Care of patient assumed. Verbal consent for EFM, external fetal monitors applied. Admission assessment completed. Patient and support persons educated on labor process. Patient instructed to report change in fetal movement, contractions, vaginal leaking of fluid or bleeding, abdominal pain, or any concerns related to the pregnancy to her nurse/physician. Patient oriented to room, call light in reach. Dr. Sanchez aware of admission. Plan per provider is monitor progress of labor. Patient verbalized understanding of education and verbalized agreement with plan. Patient coping with labor.

## 2019-09-20 NOTE — PROGRESS NOTES
We worked this patient up in the MAC with bloody show and small amount of cervical change. She was not aware of contractions until later in the evening.     Once admitted to observation things slowed down. Contractions spaced out from 2-4 min to 4-5 minutes. The patient slept for several hours.     This morning her cervical exam remains the same and there is no fresh bleeding. I will discharge her home given she is only 37/3 weeks.

## 2019-09-20 NOTE — DISCHARGE INSTRUCTIONS
Discharge Instruction for Undelivered Patients      You were seen for: Labor Assessment  We Consulted: Dr. Sanchez  You had (Test or Medicine):cervical exam, NST     Diet:   You may eat meals and snacks.     Activity:  Call your doctor or nurse midwife if your baby is moving less than usual.     Call your provider if you notice:  Swelling in your face or increased swelling in your hands or legs.  Headaches that are not relieved by Tylenol (acetaminophen).  Changes in your vision (blurring: seeing spots or stars.)  Nausea (sick to your stomach) and vomiting (throwing up).   Weight gain of 5 pounds or more per week.  Heartburn that doesn't go away.  Signs of bladder infection: pain when you urinate (use the toilet), need to go more often and more urgently.  The bag of gupta (rupture of membranes) breaks, or you notice leaking in your underwear.  Bright red blood in your underwear.  Abdominal (lower belly) or stomach pain.  For first baby: Contractions (tightening) less than 5 minutes apart for one hour or more.  Second (plus) baby: Contractions (tightening) less than 10 minutes apart and getting stronger.  *If less than 34 weeks: Contractions (tightenings) more than 6 times in one hour.  Increase or change in vaginal discharge (note the color and amount)      Follow-up:  Make appointment to be seen in clinic next week

## 2019-09-20 NOTE — PLAN OF CARE
0900-Denies needs at this time-waiting for discharge orders per Dr Sanchez.  945- Patient discharged to home with instructions verbally acknowledged with understanding by patient.  940- Refused w/c ride-prefers to ambulate.

## 2019-09-20 NOTE — PLAN OF CARE
Updated Dr Sanchez on pt status. Orders to admit. Pt to move to room 212. SHANDRA Hall RN to assume care.

## 2019-09-30 ENCOUNTER — HEALTH MAINTENANCE LETTER (OUTPATIENT)
Age: 29
End: 2019-09-30

## 2019-10-04 ENCOUNTER — HOSPITAL ENCOUNTER (INPATIENT)
Facility: CLINIC | Age: 29
LOS: 2 days | Discharge: HOME | End: 2019-10-06
Attending: OBSTETRICS & GYNECOLOGY | Admitting: OBSTETRICS & GYNECOLOGY
Payer: COMMERCIAL

## 2019-10-04 ENCOUNTER — ANESTHESIA EVENT (OUTPATIENT)
Dept: OBGYN | Facility: CLINIC | Age: 29
End: 2019-10-04
Payer: COMMERCIAL

## 2019-10-04 ENCOUNTER — ANESTHESIA (OUTPATIENT)
Dept: OBGYN | Facility: CLINIC | Age: 29
End: 2019-10-04
Payer: COMMERCIAL

## 2019-10-04 LAB
ABO + RH BLD: NORMAL
ABO + RH BLD: NORMAL
BLOOD BANK CMNT PATIENT-IMP: NORMAL
BLOOD BANK CMNT PATIENT-IMP: NORMAL
SPECIMEN EXP DATE BLD: NORMAL
T PALLIDUM AB SER QL: NONREACTIVE

## 2019-10-04 PROCEDURE — 25000128 H RX IP 250 OP 636: Performed by: ANESTHESIOLOGY

## 2019-10-04 PROCEDURE — 00HU33Z INSERTION OF INFUSION DEVICE INTO SPINAL CANAL, PERCUTANEOUS APPROACH: ICD-10-PCS | Performed by: ANESTHESIOLOGY

## 2019-10-04 PROCEDURE — 86780 TREPONEMA PALLIDUM: CPT | Performed by: PHYSICIAN ASSISTANT

## 2019-10-04 PROCEDURE — 25800030 ZZH RX IP 258 OP 636: Performed by: PHYSICIAN ASSISTANT

## 2019-10-04 PROCEDURE — 25000125 ZZHC RX 250: Performed by: ANESTHESIOLOGY

## 2019-10-04 PROCEDURE — 86900 BLOOD TYPING SEROLOGIC ABO: CPT | Performed by: PHYSICIAN ASSISTANT

## 2019-10-04 PROCEDURE — 25000132 ZZH RX MED GY IP 250 OP 250 PS 637: Performed by: OBSTETRICS & GYNECOLOGY

## 2019-10-04 PROCEDURE — 25000125 ZZHC RX 250: Performed by: OBSTETRICS & GYNECOLOGY

## 2019-10-04 PROCEDURE — 36415 COLL VENOUS BLD VENIPUNCTURE: CPT | Performed by: PHYSICIAN ASSISTANT

## 2019-10-04 PROCEDURE — 25000125 ZZHC RX 250: Performed by: PHYSICIAN ASSISTANT

## 2019-10-04 PROCEDURE — 3E0R3BZ INTRODUCTION OF ANESTHETIC AGENT INTO SPINAL CANAL, PERCUTANEOUS APPROACH: ICD-10-PCS | Performed by: ANESTHESIOLOGY

## 2019-10-04 PROCEDURE — 12000035 ZZH R&B POSTPARTUM

## 2019-10-04 PROCEDURE — 72200001 ZZH LABOR CARE VAGINAL DELIVERY SINGLE

## 2019-10-04 PROCEDURE — 86901 BLOOD TYPING SEROLOGIC RH(D): CPT | Performed by: PHYSICIAN ASSISTANT

## 2019-10-04 PROCEDURE — 37000011 ZZH ANESTHESIA WARD SERVICE

## 2019-10-04 RX ORDER — SODIUM CHLORIDE, SODIUM LACTATE, POTASSIUM CHLORIDE, CALCIUM CHLORIDE 600; 310; 30; 20 MG/100ML; MG/100ML; MG/100ML; MG/100ML
INJECTION, SOLUTION INTRAVENOUS CONTINUOUS
Status: DISCONTINUED | OUTPATIENT
Start: 2019-10-04 | End: 2019-10-06 | Stop reason: HOSPADM

## 2019-10-04 RX ORDER — ONDANSETRON 2 MG/ML
4 INJECTION INTRAMUSCULAR; INTRAVENOUS EVERY 6 HOURS PRN
Status: DISCONTINUED | OUTPATIENT
Start: 2019-10-04 | End: 2019-10-06 | Stop reason: HOSPADM

## 2019-10-04 RX ORDER — ONDANSETRON 4 MG/1
4 TABLET, ORALLY DISINTEGRATING ORAL EVERY 6 HOURS PRN
Status: DISCONTINUED | OUTPATIENT
Start: 2019-10-04 | End: 2019-10-06 | Stop reason: HOSPADM

## 2019-10-04 RX ORDER — ACETAMINOPHEN 325 MG/1
650 TABLET ORAL EVERY 4 HOURS PRN
Status: DISCONTINUED | OUTPATIENT
Start: 2019-10-04 | End: 2019-10-06 | Stop reason: HOSPADM

## 2019-10-04 RX ORDER — FENTANYL/BUPIVACAINE/NS/PF 2-1250MCG
10 PLASTIC BAG, INJECTION (ML) INJECTION CONTINUOUS
Status: DISCONTINUED | OUTPATIENT
Start: 2019-10-04 | End: 2019-10-06 | Stop reason: HOSPADM

## 2019-10-04 RX ORDER — NALOXONE HYDROCHLORIDE 0.4 MG/ML
.1-.4 INJECTION, SOLUTION INTRAMUSCULAR; INTRAVENOUS; SUBCUTANEOUS
Status: DISCONTINUED | OUTPATIENT
Start: 2019-10-04 | End: 2019-10-04

## 2019-10-04 RX ORDER — ROPIVACAINE HYDROCHLORIDE 2 MG/ML
10 INJECTION, SOLUTION EPIDURAL; INFILTRATION; PERINEURAL ONCE
Status: COMPLETED | OUTPATIENT
Start: 2019-10-04 | End: 2019-10-04

## 2019-10-04 RX ORDER — NALBUPHINE HYDROCHLORIDE 10 MG/ML
2.5-5 INJECTION, SOLUTION INTRAMUSCULAR; INTRAVENOUS; SUBCUTANEOUS EVERY 6 HOURS PRN
Status: DISCONTINUED | OUTPATIENT
Start: 2019-10-04 | End: 2019-10-06 | Stop reason: HOSPADM

## 2019-10-04 RX ORDER — NALOXONE HYDROCHLORIDE 0.4 MG/ML
.1-.4 INJECTION, SOLUTION INTRAMUSCULAR; INTRAVENOUS; SUBCUTANEOUS
Status: DISCONTINUED | OUTPATIENT
Start: 2019-10-04 | End: 2019-10-06 | Stop reason: HOSPADM

## 2019-10-04 RX ORDER — AMOXICILLIN 250 MG
2 CAPSULE ORAL 2 TIMES DAILY
Status: DISCONTINUED | OUTPATIENT
Start: 2019-10-04 | End: 2019-10-06 | Stop reason: HOSPADM

## 2019-10-04 RX ORDER — OXYTOCIN/0.9 % SODIUM CHLORIDE 30/500 ML
340 PLASTIC BAG, INJECTION (ML) INTRAVENOUS CONTINUOUS PRN
Status: DISCONTINUED | OUTPATIENT
Start: 2019-10-04 | End: 2019-10-06 | Stop reason: HOSPADM

## 2019-10-04 RX ORDER — OXYTOCIN/0.9 % SODIUM CHLORIDE 30/500 ML
100-340 PLASTIC BAG, INJECTION (ML) INTRAVENOUS CONTINUOUS PRN
Status: DISCONTINUED | OUTPATIENT
Start: 2019-10-04 | End: 2019-10-06 | Stop reason: HOSPADM

## 2019-10-04 RX ORDER — AMOXICILLIN 250 MG
1 CAPSULE ORAL 2 TIMES DAILY
Status: DISCONTINUED | OUTPATIENT
Start: 2019-10-04 | End: 2019-10-06 | Stop reason: HOSPADM

## 2019-10-04 RX ORDER — LANOLIN 100 %
OINTMENT (GRAM) TOPICAL
Status: DISCONTINUED | OUTPATIENT
Start: 2019-10-04 | End: 2019-10-06 | Stop reason: HOSPADM

## 2019-10-04 RX ORDER — ACETAMINOPHEN 325 MG/1
650 TABLET ORAL EVERY 4 HOURS PRN
Status: DISCONTINUED | OUTPATIENT
Start: 2019-10-04 | End: 2019-10-04

## 2019-10-04 RX ORDER — METHYLERGONOVINE MALEATE 0.2 MG/ML
200 INJECTION INTRAVENOUS
Status: DISCONTINUED | OUTPATIENT
Start: 2019-10-04 | End: 2019-10-06 | Stop reason: HOSPADM

## 2019-10-04 RX ORDER — OXYTOCIN 10 [USP'U]/ML
10 INJECTION, SOLUTION INTRAMUSCULAR; INTRAVENOUS
Status: DISCONTINUED | OUTPATIENT
Start: 2019-10-04 | End: 2019-10-06 | Stop reason: HOSPADM

## 2019-10-04 RX ORDER — HYDROCORTISONE 2.5 %
CREAM (GRAM) TOPICAL 3 TIMES DAILY PRN
Status: DISCONTINUED | OUTPATIENT
Start: 2019-10-04 | End: 2019-10-06 | Stop reason: HOSPADM

## 2019-10-04 RX ORDER — OXYTOCIN/0.9 % SODIUM CHLORIDE 30/500 ML
1-24 PLASTIC BAG, INJECTION (ML) INTRAVENOUS CONTINUOUS
Status: DISCONTINUED | OUTPATIENT
Start: 2019-10-04 | End: 2019-10-04

## 2019-10-04 RX ORDER — CARBOPROST TROMETHAMINE 250 UG/ML
250 INJECTION, SOLUTION INTRAMUSCULAR
Status: DISCONTINUED | OUTPATIENT
Start: 2019-10-04 | End: 2019-10-06 | Stop reason: HOSPADM

## 2019-10-04 RX ORDER — BISACODYL 10 MG
10 SUPPOSITORY, RECTAL RECTAL DAILY PRN
Status: DISCONTINUED | OUTPATIENT
Start: 2019-10-06 | End: 2019-10-06 | Stop reason: HOSPADM

## 2019-10-04 RX ORDER — IBUPROFEN 400 MG/1
800 TABLET, FILM COATED ORAL EVERY 6 HOURS PRN
Status: DISCONTINUED | OUTPATIENT
Start: 2019-10-04 | End: 2019-10-06 | Stop reason: HOSPADM

## 2019-10-04 RX ORDER — CALCIUM CARBONATE 500 MG/1
2 TABLET, CHEWABLE ORAL 2 TIMES DAILY PRN
COMMUNITY

## 2019-10-04 RX ORDER — LIDOCAINE 40 MG/G
CREAM TOPICAL
Status: DISCONTINUED | OUTPATIENT
Start: 2019-10-04 | End: 2019-10-06 | Stop reason: HOSPADM

## 2019-10-04 RX ORDER — MISOPROSTOL 200 UG/1
800 TABLET ORAL
Status: DISCONTINUED | OUTPATIENT
Start: 2019-10-04 | End: 2019-10-06 | Stop reason: HOSPADM

## 2019-10-04 RX ORDER — OXYCODONE AND ACETAMINOPHEN 5; 325 MG/1; MG/1
1 TABLET ORAL
Status: DISCONTINUED | OUTPATIENT
Start: 2019-10-04 | End: 2019-10-06 | Stop reason: HOSPADM

## 2019-10-04 RX ORDER — OXYTOCIN/0.9 % SODIUM CHLORIDE 30/500 ML
100 PLASTIC BAG, INJECTION (ML) INTRAVENOUS CONTINUOUS
Status: DISCONTINUED | OUTPATIENT
Start: 2019-10-04 | End: 2019-10-06 | Stop reason: HOSPADM

## 2019-10-04 RX ORDER — LIDOCAINE HYDROCHLORIDE AND EPINEPHRINE 15; 5 MG/ML; UG/ML
INJECTION, SOLUTION EPIDURAL PRN
Status: DISCONTINUED | OUTPATIENT
Start: 2019-10-04 | End: 2019-10-05 | Stop reason: HOSPADM

## 2019-10-04 RX ORDER — EPHEDRINE SULFATE 50 MG/ML
5 INJECTION, SOLUTION INTRAMUSCULAR; INTRAVENOUS; SUBCUTANEOUS
Status: DISCONTINUED | OUTPATIENT
Start: 2019-10-04 | End: 2019-10-06 | Stop reason: HOSPADM

## 2019-10-04 RX ORDER — IBUPROFEN 400 MG/1
800 TABLET, FILM COATED ORAL
Status: DISCONTINUED | OUTPATIENT
Start: 2019-10-04 | End: 2019-10-04

## 2019-10-04 RX ADMIN — Medication 2 MILLI-UNITS/MIN: at 09:37

## 2019-10-04 RX ADMIN — SODIUM CHLORIDE, POTASSIUM CHLORIDE, SODIUM LACTATE AND CALCIUM CHLORIDE: 600; 310; 30; 20 INJECTION, SOLUTION INTRAVENOUS at 09:37

## 2019-10-04 RX ADMIN — LIDOCAINE HYDROCHLORIDE AND EPINEPHRINE 3 ML: 15; 5 INJECTION, SOLUTION EPIDURAL at 12:03

## 2019-10-04 RX ADMIN — SODIUM CHLORIDE, POTASSIUM CHLORIDE, SODIUM LACTATE AND CALCIUM CHLORIDE: 600; 310; 30; 20 INJECTION, SOLUTION INTRAVENOUS at 11:54

## 2019-10-04 RX ADMIN — ACETAMINOPHEN 650 MG: 325 TABLET, FILM COATED ORAL at 18:38

## 2019-10-04 RX ADMIN — Medication 340 ML/HR: at 16:00

## 2019-10-04 RX ADMIN — Medication 10 ML/HR: at 12:21

## 2019-10-04 RX ADMIN — LIDOCAINE HYDROCHLORIDE AND EPINEPHRINE 2 ML: 15; 5 INJECTION, SOLUTION EPIDURAL at 12:06

## 2019-10-04 RX ADMIN — IBUPROFEN 800 MG: 400 TABLET ORAL at 18:38

## 2019-10-04 RX ADMIN — Medication 100 ML/HR: at 16:22

## 2019-10-04 RX ADMIN — ROPIVACAINE HYDROCHLORIDE 10 ML: 2 INJECTION, SOLUTION EPIDURAL; INFILTRATION at 12:09

## 2019-10-04 ASSESSMENT — ENCOUNTER SYMPTOMS: SEIZURES: 0

## 2019-10-04 NOTE — ANESTHESIA PROCEDURE NOTES
Peripheral nerve/Neuraxial procedure note : epidural catheter  Pre-Procedure  Performed by Lucio Wilburn MD  Location: OB      Pre-Anesthestic Checklist: patient identified, IV checked, site marked, risks and benefits discussed, informed consent, monitors and equipment checked, pre-op evaluation and at physician/surgeon's request    Timeout  Correct Patient: Yes   Correct Procedure: Yes   Correct Site: Yes   Correct Laterality: Yes   Correct Position: Yes   Site Marked: Yes   .   Procedure Documentation    .    Procedure: epidural catheter, .   Patient Position:sitting Insertion Site:L3-4  (midline approach) Injection technique: LORT saline   Local skin infiltrated with 1 mL of 1% lidocaine.      Patient Prep/Sterile Barriers; mask, sterile gloves, povidone-iodine 7.5% surgical scrub, patient draped.  .  Needle: Touhy needle   Needle Gauge: 17.    Needle Length (Inches) 3.5   # of attempts: 1 and # of redirects:  .    Catheter: 19 G . .  Catheter threaded easily  .  .   .    Assessment/Narrative  Paresthesias: No.  .  .  Aspiration negative for heme or CSF  . Test dose of 3 mL lidocaine 1.5% w/ 1:200,000 epinephrine at. Test dose negative for signs of intravascular, subdural or intrathecal injection. Comments:  Pre-procedure time out completed. Patient in sitting position, the lumbar spine was prepped and draped in sterile fashion. The L2/L3 interspace was identified and local anesthetic was injected for local skin infiltration. A 17 G touhy needle was advanced to the epidural space which was confirmed with the loss of resistance technique at 6 cm. A catheter was then advanced easily into the epidural space. The catheter was left at 10 cm at the skin. Negative aspiration of blood and CSF was confirmed. A test dose of 1.5% lidocaine with 1:200,000 epinephrine was injected through the catheter and was negative for intravascular injection. The site was covered with sterile tegaderm and the catheter was  secured with tape.

## 2019-10-04 NOTE — L&D DELIVERY NOTE
Admission date: 10/4/2019  Delivery date:  10/04/19  Place of delivery: [facility]    The patient is a 29 year old  at 39w3d  wks gestation admitted to labor and delivery for elective IOL at term.  Her  course was uncomplicated.  The estimate fetal weight is 9#.      For pain management she had an epidural with good relief.  Pitocin was titrated per protocol.   Membranes were ruptured and the fluid was clear at 8:30 am.    She progressed to complete dilation at 2:30 pm.  We allowed her to labor down as fetal station was high and she did not feel any pressure.  She had excellent maternal expulsive efforts, and after pushing for 10 minutes, she delivered a viable male infant weighing (not yet weighed) pounds ounces with apgars of 8 at 1 min and 9 at 5 minutes, via a normal spontaneous vaginal delivery over an intact perineum.  The infant was vigorous, and mouth and nose were bulb suctioned upon delivery.  The infant was handed off to his parents and the nursery team in attendance.    The placenta delivered spontaneously and intact.  The uterus was made firm with IV pitocin and uterine massage.  The estimated blood loss was 100 mL.    The cervix and vagina were inspected.  There were no perineal laceration which was repaired with 3-0 vicryl assuring hemostasis and close approximation.  The patient tolerated this well.     During labor the fetal heart tones were category 1.    Mother and baby did well and went to normal postpartum and  nursery.    GBS was negative.     Marylu Armstrong MD

## 2019-10-04 NOTE — PLAN OF CARE
Pt admitted for elective induction this morning. Spouse present and supportive. With pt verbal consent, fetal monitors applied to maternal abdomen.  Dr. Armstrong at bedside to assess and discuss plan of care with pt/spouse. AROM clear, moderate amount of fluid. Pitocin started per unit protocol and with pt verbal consent. FHTs moderate variability, +accels.  Epidural placed without issue and pt tolerated well. Pain relief from epidural. Pt progressed to complete, occasional variables noted. Dr. Armstrong called to bedside for delivery. Pt labored down x1hr. See delivery record for details. Cont to monitor and assess.

## 2019-10-04 NOTE — PLAN OF CARE
Pt arrived from 2nd floor with infant and . Oriented to plan of care, safety measures, and call light. Tylenol and ibuprofen given for pain. Pt able to void before coming upstairs. Encouraged to call for assistance to bathroom next time. IV Pitocin infusing. Pt denies needs.     Pt has not received flu vaccine. States she does not want in hospital and will get it outpatient.

## 2019-10-04 NOTE — H&P
Labor and delivery admit note.  HPI  Guera Abreu  is a 29 year old   at 39w3d who was admitted for elective IOL at term.      Estimated Date of Delivery: Oct 8, 2019  39w3d  Prenatal care - early and adequate with Dr. Armstrong, dated by LMP c/w first trimester [facility]    Prenatal course - macrosomia,     Prenatal labs  Blood type A, Rh negative  HIV-negative  Hepatitis BsAg- negative  Trep AB- Negative  Rubella- Immune  Pap- normal  GC/Chlamydia- negative  Quad screen- normal  Glucola- normal  GBS- negative.    Past Medical History:   Diagnosis Date     Allergic rhinitis, cause unspecified      Other closed skull fracture without mention of intracranial injury, unspecified state of consciousness      Tendonitis of wrist, right 2012     Past Surgical History:   Procedure Laterality Date      wisdom teeth       NO HISTORY OF SURGERY       Social History     Tobacco Use     Smoking status: Never Smoker     Smokeless tobacco: Never Used   Substance Use Topics     Alcohol use: No     Drug use: No       Objective  Alert and oriented  LMP 2019 (Exact Date)   Heart and Lungs- normal  PA- uterus full term. Contractions + q 2-3 mins  FHR- 130 baseline, good variability, accels +. Mild variables seen.  Pelvic ( by admitting RN )  Cx: 3/60/-2/soft/posterior.    Assessment/Plan:   at 39 weeks - elective IOL  Uneventful prenatal course.    Plan  Admit.  Expectant.  AROM and pitocin as needed  Epidural prn  Routine intrapartum management.    Marylu Armstrong MD

## 2019-10-04 NOTE — ANESTHESIA PREPROCEDURE EVALUATION
Anesthesia Pre-Procedure Evaluation    Patient: Guera Abreu   MRN: [id number] : 1990          Preoperative Diagnosis: * No surgery found *        Past Medical History:   Diagnosis Date     Allergic rhinitis, cause unspecified      Other closed skull fracture without mention of intracranial injury, unspecified state of consciousness      Tendonitis of wrist, right 2012     Past Surgical History:   Procedure Laterality Date      wisdom teeth       NO HISTORY OF SURGERY         Anesthesia Evaluation       history and physical reviewed .      No history of anesthetic complications          ROS/MED HX    ENT/Pulmonary:  - neg pulmonary ROS    (-) asthma, sleep apnea and recent URI   Neurologic:  - neg neurologic ROS    (-) seizures   Cardiovascular:  - neg cardiovascular ROS      (-) hypertension   METS/Exercise Tolerance:  >4 METS   Hematologic:  - neg hematologic  ROS       Musculoskeletal:  - neg musculoskeletal ROS       GI/Hepatic:  - neg GI/hepatic ROS   (+) GERD       Renal/Genitourinary:  - ROS Renal section negative       Endo:  - neg endo ROS       Psychiatric:  - neg psychiatric ROS       Infectious Disease:  - neg infectious disease ROS       Malignancy:      - no malignancy   Other:                                 Lab Results   Component Value Date    WBC 12.6 (H) 2017    HGB 12.1 2017    HCT 35.7 2017     2017     2012    POTASSIUM 4.0 2012    CHLORIDE 106 2012    CO2 24 2012    BUN 9 2012    CR 0.64 2012    GLC 86 2012    MATTHEW 8.9 2012    MAG 2.2 2007    ALBUMIN 4.0 2012    PROTTOTAL 6.9 2012    ALT <6 2012    AST 19 2012    ALKPHOS 55 2012    BILITOTAL 0.6 2012    TSH 2.19 2012    HCG Negative 2019       Preop Vitals  BP Readings from Last 3 Encounters:   10/04/19 110/70   19 100/68   19 108/68    Pulse Readings from Last 3 Encounters:  "  10/04/19 103   01/28/19 86   09/11/17 85      Resp Readings from Last 3 Encounters:   10/04/19 16   09/20/19 16   11/06/17 16    SpO2 Readings from Last 3 Encounters:   01/28/19 97%   11/04/17 95%   09/11/17 99%      Temp Readings from Last 1 Encounters:   10/04/19 36.8  C (98.3  F) (Temporal)    Ht Readings from Last 1 Encounters:   09/19/19 1.549 m (5' 1\")      Wt Readings from Last 1 Encounters:   09/19/19 83 kg (183 lb)    Estimated body mass index is 34.58 kg/m  as calculated from the following:    Height as of 9/19/19: 1.549 m (5' 1\").    Weight as of 9/19/19: 83 kg (183 lb).       Anesthesia Plan      History & Physical Review      ASA Status:  2 .  OB Epidural Asa: 2            Postoperative Care      Consents  Anesthetic plan, risks, benefits and alternatives discussed with:  Patient..                 Lucio Wilburn MD  "

## 2019-10-05 PROCEDURE — 12000035 ZZH R&B POSTPARTUM

## 2019-10-05 PROCEDURE — 25000132 ZZH RX MED GY IP 250 OP 250 PS 637: Performed by: OBSTETRICS & GYNECOLOGY

## 2019-10-05 RX ADMIN — IBUPROFEN 800 MG: 400 TABLET ORAL at 00:39

## 2019-10-05 RX ADMIN — ACETAMINOPHEN 650 MG: 325 TABLET, FILM COATED ORAL at 20:30

## 2019-10-05 RX ADMIN — IBUPROFEN 800 MG: 400 TABLET ORAL at 08:25

## 2019-10-05 RX ADMIN — ACETAMINOPHEN 650 MG: 325 TABLET, FILM COATED ORAL at 08:25

## 2019-10-05 RX ADMIN — IBUPROFEN 800 MG: 400 TABLET ORAL at 14:30

## 2019-10-05 RX ADMIN — SENNOSIDES AND DOCUSATE SODIUM 1 TABLET: 8.6; 5 TABLET ORAL at 08:25

## 2019-10-05 RX ADMIN — ACETAMINOPHEN 650 MG: 325 TABLET, FILM COATED ORAL at 00:39

## 2019-10-05 RX ADMIN — SENNOSIDES AND DOCUSATE SODIUM 1 TABLET: 8.6; 5 TABLET ORAL at 00:39

## 2019-10-05 RX ADMIN — ACETAMINOPHEN 650 MG: 325 TABLET, FILM COATED ORAL at 14:30

## 2019-10-05 RX ADMIN — IBUPROFEN 800 MG: 400 TABLET ORAL at 20:30

## 2019-10-05 RX ADMIN — SENNOSIDES AND DOCUSATE SODIUM 1 TABLET: 8.6; 5 TABLET ORAL at 20:30

## 2019-10-05 NOTE — LACTATION NOTE
Initial Lactation visit. Hand out given. Recommend unlimited, frequent breast feedings: At least 8 - 12 times every 24 hours. Avoid pacifiers and supplementation with formula unless medically indicated. Explained benefits of holding baby skin on skin to help promote better breastfeeding outcomes.     Guera states breastfeeding is going well so far. She states she's concerned about nipple soreness because her nipples got very sore, cracked and scabbed with her first child. She has some lanolin she's using as well as APNO prescribed with her first child. Recommended she continue using lanolin and get a new prescription for APNO. Warm compresses discussed as well. She states her nipples are not sore now. Infant latched and sucked well. Swallows heard. Encouraged her to continue calling staff for latch checks and assist with feedings as needed. Guera and her  appreciative of my visit. Will revisit as needed.     Rebecca Minor RN IBCLC

## 2019-10-05 NOTE — ANESTHESIA POSTPROCEDURE EVALUATION
Patient: Guera Abreu    * No procedures listed *    Diagnosis:* No pre-op diagnosis entered *  Diagnosis Additional Information: No value filed.    Anesthesia Type:  No value filed.    Note:  Anesthesia Post Evaluation    Patient location during evaluation: Floor (Postpartum Room)  Patient participation: Able to fully participate in evaluation  Level of consciousness: awake and alert  Pain management: adequate  Airway patency: patent  Cardiovascular status: acceptable and hemodynamically stable  Respiratory status: acceptable, room air and spontaneous ventilation  Hydration status: euvolemic  PONV: none     Anesthetic complications: None    Comments: Denies lower extremity weakness, numbness, or tingling.  Ambulating without difficulty. No complaints/concerns.         Last vitals:  Vitals:    10/05/19 0039 10/05/19 0820 10/05/19 1557   BP: 99/68 113/70 105/52   Pulse:      Resp: 16 16 16   Temp: 36.7  C (98  F) 36.9  C (98.5  F) 36.8  C (98.3  F)   SpO2:            Electronically Signed By: Luico Wilburn MD  October 5, 2019  4:48 PM

## 2019-10-05 NOTE — PLAN OF CARE
Vitals stable. Up ad drew well. Voiding without difficulty. Pain controlled with tylenol and ibuprofen. Aqua K to abdomen for cramping. Breastfeeding going well. Depression screen completed. Pt has breast pump at home. Shown codes to watch shaken baby videos. Working on birth certificate form.

## 2019-10-05 NOTE — PROGRESS NOTES
[facility]       DAILY NOTE - POSTPARTUM DAY 1     SUBJECTIVE:     Pain controlled? Yes  Tolerating a regular diet? YES  Ambulating? YES  Voiding without difficulty? Yes  Breast feeding. Baby doing well.    OBJECTIVE:  Vitals:    10/04/19 1800 10/04/19 2000 10/05/19 0039 10/05/19 0820   BP: 121/75 119/72 99/68 113/70   Pulse:       Resp:  16 16 16   Temp:  98  F (36.7  C) 98  F (36.7  C) 98.5  F (36.9  C)   TempSrc:  Oral Oral Oral   SpO2:           Constitutional: healthy, alert and no distress    Abdomen:  Uterine fundus is firm, non-tender and at the level of the umbilicus     Ext: trace edema, no CT      LABS:  Hemoglobin   Date Value Ref Range Status   2017 12.1 11.7 - 15.7 g/dL Final   2012 12.3 11.7 - 15.7 g/dL Final       ASSESSMENT:  Post-partum day #1 s/p   Pregnancy complicated by NO COMPLICATIONS    Doing well.  No excessive bleeding       PLAN:   Ambulation encouraged  Continue routine postpartum cares  Anticipate discharge tomorrow    Lori Magana MD

## 2019-10-05 NOTE — PLAN OF CARE
Vitals within defined limits. Fundus firm. Lochia scant. Up ad drew. Voiding without issues. Using Tylenol/Motrin for uterine cramping with good relief. Heat packs used for comfort. Working on breastfeeding infant. Demonstrated hand expression and was able to see good amount of colostrum. Demonstrated different holds/positions. Will continue to monitor.

## 2019-10-06 VITALS
OXYGEN SATURATION: 100 % | HEART RATE: 103 BPM | SYSTOLIC BLOOD PRESSURE: 109 MMHG | DIASTOLIC BLOOD PRESSURE: 73 MMHG | TEMPERATURE: 98.5 F | RESPIRATION RATE: 16 BRPM

## 2019-10-06 PROCEDURE — 25000132 ZZH RX MED GY IP 250 OP 250 PS 637: Performed by: OBSTETRICS & GYNECOLOGY

## 2019-10-06 RX ADMIN — IBUPROFEN 800 MG: 400 TABLET ORAL at 07:46

## 2019-10-06 RX ADMIN — SENNOSIDES AND DOCUSATE SODIUM 1 TABLET: 8.6; 5 TABLET ORAL at 07:46

## 2019-10-06 RX ADMIN — IBUPROFEN 800 MG: 400 TABLET ORAL at 02:03

## 2019-10-06 RX ADMIN — ACETAMINOPHEN 650 MG: 325 TABLET, FILM COATED ORAL at 07:46

## 2019-10-06 RX ADMIN — ACETAMINOPHEN 650 MG: 325 TABLET, FILM COATED ORAL at 02:03

## 2019-10-06 NOTE — PROGRESS NOTES
[facility]       DAILY NOTE - POSTPARTUM DAY 2     SUBJECTIVE:     Pain controlled? Yes  Tolerating a regular diet? YES  Ambulating? YES  Voiding without difficulty? Yes    OBJECTIVE:  Vitals:    10/05/19 0820 10/05/19 1557 10/06/19 0203 10/06/19 0746   BP: 113/70 105/52 115/68 109/73   Pulse:       Resp: 16 16 16 16   Temp: 98.5  F (36.9  C) 98.3  F (36.8  C) 97.4  F (36.3  C) 98.5  F (36.9  C)   TempSrc: Oral Oral Oral Oral   SpO2:           Constitutional: healthy, alert and no distress    Abdomen:  Uterine fundus is firm, non-tender and at the level of the umbilicus     Incision: Healing well      LABS:  Hemoglobin   Date Value Ref Range Status   2017 12.1 11.7 - 15.7 g/dL Final   2012 12.3 11.7 - 15.7 g/dL Final       ASSESSMENT:  Post-partum day #2 s/p   Pregnancy complicated by NO COMPLICATIONS    Doing well.       PLAN:   Discharge today.  Return to clinic in 6 weeks.   Continue routine postpartum cares    Tejas Sanchez MD

## 2019-10-06 NOTE — PLAN OF CARE
Vitals within defined limits. Fundus firm. Lochia scant. Up ad drew. Voiding without issues. Using Tylenol/Motrin for uterine cramping with good relief. Aqua Kpad used intermittently with good relief. Breastfeeding infant well overnight. Nipples feeling sore, encouraged continued use of lanolin. Will continue to monitor.

## 2019-10-06 NOTE — LACTATION NOTE
Routine visit. Guera is discharging home today with her baby and . She states breastfeeding has continued to go well and denies questions or concerns. Recommended she continue using infant feeding log to monitor infant's feedings, voids and stools and use outpatient lactation resources as needed. Guera and her  appreciative of my visit.

## 2019-10-06 NOTE — PLAN OF CARE
Vitals stable. Up ad drew well. Voiding without difficulty. Breastfeeding going well. Pain controlled with tylenol and ibuprofen. Anticipate discharge home today with infant.

## 2019-10-06 NOTE — PLAN OF CARE
Mom and baby discharge instructions discussed with pt and spouse who verbalize understanding. No discharge prescriptions. Pt has breast pump. Mom and baby ID bands matched. Security alarms will be removed. Ready for discharge home with infant after circumcision assessments completed.

## 2021-01-15 ENCOUNTER — HEALTH MAINTENANCE LETTER (OUTPATIENT)
Age: 31
End: 2021-01-15

## 2021-10-24 ENCOUNTER — HEALTH MAINTENANCE LETTER (OUTPATIENT)
Age: 31
End: 2021-10-24

## 2021-12-19 ENCOUNTER — HEALTH MAINTENANCE LETTER (OUTPATIENT)
Age: 31
End: 2021-12-19

## 2022-10-15 ENCOUNTER — HEALTH MAINTENANCE LETTER (OUTPATIENT)
Age: 32
End: 2022-10-15

## 2023-03-26 ENCOUNTER — HEALTH MAINTENANCE LETTER (OUTPATIENT)
Age: 33
End: 2023-03-26

## 2024-06-20 ENCOUNTER — LAB REQUISITION (OUTPATIENT)
Dept: LAB | Facility: CLINIC | Age: 34
End: 2024-06-20
Payer: COMMERCIAL

## 2024-06-20 LAB
ERYTHROCYTE [DISTWIDTH] IN BLOOD BY AUTOMATED COUNT: 12.7 % (ref 10–15)
HBA1C MFR BLD: 5.5 %
HCT VFR BLD AUTO: 39.7 % (ref 35–47)
HGB BLD-MCNC: 13.2 G/DL (ref 11.7–15.7)
MCH RBC QN AUTO: 27.8 PG (ref 26.5–33)
MCHC RBC AUTO-ENTMCNC: 33.2 G/DL (ref 31.5–36.5)
MCV RBC AUTO: 84 FL (ref 78–100)
PLATELET # BLD AUTO: 291 10E3/UL (ref 150–450)
RBC # BLD AUTO: 4.75 10E6/UL (ref 3.8–5.2)
WBC # BLD AUTO: 7.2 10E3/UL (ref 4–11)

## 2024-06-20 PROCEDURE — 80076 HEPATIC FUNCTION PANEL: CPT | Mod: ORL | Performed by: OBSTETRICS & GYNECOLOGY

## 2024-06-20 PROCEDURE — 84443 ASSAY THYROID STIM HORMONE: CPT | Mod: ORL | Performed by: OBSTETRICS & GYNECOLOGY

## 2024-06-20 PROCEDURE — 83036 HEMOGLOBIN GLYCOSYLATED A1C: CPT | Mod: ORL | Performed by: OBSTETRICS & GYNECOLOGY

## 2024-06-20 PROCEDURE — 85027 COMPLETE CBC AUTOMATED: CPT | Mod: ORL | Performed by: OBSTETRICS & GYNECOLOGY

## 2024-06-21 LAB
ALBUMIN SERPL BCG-MCNC: 4.4 G/DL (ref 3.5–5.2)
ALP SERPL-CCNC: 75 U/L (ref 40–150)
ALT SERPL W P-5'-P-CCNC: 8 U/L (ref 0–50)
AST SERPL W P-5'-P-CCNC: 20 U/L (ref 0–45)
BILIRUB DIRECT SERPL-MCNC: <0.2 MG/DL (ref 0–0.3)
BILIRUB SERPL-MCNC: 0.2 MG/DL
PROT SERPL-MCNC: 7 G/DL (ref 6.4–8.3)
TSH SERPL DL<=0.005 MIU/L-ACNC: 0.9 UIU/ML (ref 0.3–4.2)